# Patient Record
Sex: FEMALE | Race: WHITE | NOT HISPANIC OR LATINO | Employment: FULL TIME | URBAN - METROPOLITAN AREA
[De-identification: names, ages, dates, MRNs, and addresses within clinical notes are randomized per-mention and may not be internally consistent; named-entity substitution may affect disease eponyms.]

---

## 2017-07-31 DIAGNOSIS — G56.03 BILATERAL CARPAL TUNNEL SYNDROME: Primary | ICD-10-CM

## 2017-07-31 RX ORDER — IBUPROFEN 800 MG/1
800 TABLET ORAL EVERY 8 HOURS PRN
Qty: 30 TABLET | Refills: 0 | Status: SHIPPED | OUTPATIENT
Start: 2017-07-31 | End: 2018-08-17

## 2017-07-31 RX ORDER — IBUPROFEN 800 MG/1
800 TABLET ORAL EVERY 8 HOURS PRN
Refills: 0 | COMMUNITY
Start: 2017-06-07 | End: 2017-07-31 | Stop reason: SDUPTHER

## 2018-08-22 ENCOUNTER — OFFICE VISIT (OUTPATIENT)
Dept: FAMILY MEDICINE | Facility: CLINIC | Age: 33
End: 2018-08-22
Payer: MEDICAID

## 2018-08-22 VITALS
BODY MASS INDEX: 25.9 KG/M2 | DIASTOLIC BLOOD PRESSURE: 62 MMHG | SYSTOLIC BLOOD PRESSURE: 118 MMHG | OXYGEN SATURATION: 99 % | HEIGHT: 64 IN | WEIGHT: 151.69 LBS | HEART RATE: 83 BPM

## 2018-08-22 DIAGNOSIS — R53.82 CHRONIC FATIGUE: ICD-10-CM

## 2018-08-22 DIAGNOSIS — Z84.0 FAMILY HISTORY OF LUPUS ERYTHEMATOSUS: Primary | ICD-10-CM

## 2018-08-22 DIAGNOSIS — F52.0 LACK OF LIBIDO: ICD-10-CM

## 2018-08-22 DIAGNOSIS — Z00.00 ANNUAL PHYSICAL EXAM: Primary | ICD-10-CM

## 2018-08-22 DIAGNOSIS — Z13.220 LIPID SCREENING: ICD-10-CM

## 2018-08-22 PROCEDURE — 99395 PREV VISIT EST AGE 18-39: CPT | Mod: ,,, | Performed by: NURSE PRACTITIONER

## 2018-08-22 NOTE — PROGRESS NOTES
HPI: Pepper Fitzpatrick  is a 32 y.o. female who presents for annual physical .  No complaints at this time.     Review of Systems   Constitutional: Negative for activity change, appetite change and fever.   HENT: Negative for congestion, ear discharge, ear pain, sore throat, trouble swallowing and voice change.    Eyes: Negative for photophobia, pain, discharge and visual disturbance.   Respiratory: Negative for cough, chest tightness and shortness of breath.    Cardiovascular: Negative for chest pain and palpitations.   Gastrointestinal: Negative for abdominal pain, nausea and vomiting.   Endocrine: Negative for cold intolerance and heat intolerance.   Genitourinary: Negative for difficulty urinating and dysuria.   Musculoskeletal: Negative for arthralgias and gait problem.   Skin: Negative for rash.   Allergic/Immunologic: Negative for immunocompromised state.   Neurological: Negative for speech difficulty and headaches.   Psychiatric/Behavioral: Negative for confusion, self-injury and suicidal ideas.     Review of patient's allergies indicates:  No Known Allergies  Past Medical History:   Diagnosis Date    Abnormal Pap smear     Acute nonintractable headache     Arthralgia     Breast disorder     Carpal tunnel syndrome, bilateral     Chronic constipation     Renal calculi 3/25/2013     Past Surgical History:   Procedure Laterality Date    ABDOMINAL SURGERY      breast augmentation  2006    w/breast lift    BREAST SURGERY  2002    Mastitis    COSMETIC SURGERY      HERNIA REPAIR  2005    UMBILICAL HERNIA REPAIR       Family History   Problem Relation Age of Onset    Lupus Mother     Lupus Sister      Social History     Tobacco Use    Smoking status: Former Smoker    Smokeless tobacco: Never Used   Substance Use Topics    Alcohol use: Yes     Comment: occasional    Drug use: No      Health Maintenance Topics with due status: Not Due       Topic Last Completion Date    Pap Smear with HPV  Cot[facility] 07/16/2018       There is no immunization history on file for this patient.  OBJECTIVE:      Vitals:    08/22/18 1441   BP: 118/62   Pulse: 83     Physical Exam   Constitutional: She is oriented to person, place, and time. She appears well-developed and well-nourished. She is cooperative. No distress.   HENT:   Head: Normocephalic and atraumatic.   Right Ear: Tympanic membrane and external ear normal.   Left Ear: Tympanic membrane and external ear normal.   Nose: Nose normal.   Mouth/Throat: Oropharynx is clear and moist.   Eyes: Conjunctivae, EOM and lids are normal. Pupils are equal, round, and reactive to light. Lids are everted and swept, no foreign bodies found. Right pupil is round and reactive. Left pupil is round and reactive.   Neck: Trachea normal and normal range of motion. Neck supple.   Cardiovascular: Normal rate, regular rhythm, S1 normal, S2 normal, normal heart sounds and intact distal pulses.   No murmur heard.  Pulmonary/Chest: Effort normal and breath sounds normal. She has no wheezes.   Abdominal: Soft. Bowel sounds are normal. There is no rigidity and no guarding.   Musculoskeletal: Normal range of motion.   Lymphadenopathy:     She has no cervical adenopathy.     She has no axillary adenopathy.   Neurological: She is alert and oriented to person, place, and time.   Skin: Skin is warm and dry. Capillary refill takes less than 2 seconds.   Psychiatric: She has a normal mood and affect. Her behavior is normal. Judgment and thought content normal.   Nursing note and vitals reviewed.     Assessment:       1. Annual physical exam    2. Lipid screening        Plan:       Annual physical exam  -     Urinalysis; Future; Expected date: 08/22/2018    Lipid screening  -     Lipid panel; Future; Expected date: 08/22/2018    Other orders  -     Cancel: Tdap Vaccine  -Patient had baby 3 years ago and had tdap at that time.      Patient counseled on age appropriate medical preventative services, age  appropriate cancer screenings, nutrition, healthy diet, consistent exercise regimen and maintaining an active lifestyle.      Counseled on age appropriate vaccines and discussed upcoming health care needs based on age/gender.  Spent time with patient counseling on need for a good patient/doctor relationship moving forward.  Discussed use of common OTC medications and supplements.  Discussed common dietary aids and use of caffeine and the need for good sleep hygiene and stress management.    Follow-up in about 2 weeks (around 9/5/2018) for lab review.      8/22/2018 CASEY Knutson, FNP-C

## 2018-08-22 NOTE — PATIENT INSTRUCTIONS
Prevention Guidelines, Women Ages 18 to 39  Screening tests and vaccines are an important part of managing your health. Health counseling is essential, too. Below are guidelines for these, for women ages 18 to 39. Talk with your healthcare provider to make sure youre up-to-date on what you need.  Screening Who needs it How often   Alcohol misuse All women in this age group At routine exams   Blood pressure All women in this age group Every 2 years if your blood pressure is less than 120/80 mm Hg; yearly if your systolic blood pressure is 120 to 139 mm Hg, or your diastolic blood pressure reading is 80 to 89 mm Hg   Breast cancer All women in this age group should talk with their healthcare providers about the need for clinical breast exams (CBE)1 Clinical breast exam every 3 years1   Cervical cancer Women ages 21 and older Women between ages 21 and 29 should have a Pap test every 3 years; women between ages 30 and 65 are advised to have a Pap test plus an HPV test every 5 years   Chlamydia Sexually active women ages 24 and younger, and women at increased risk for infection Every 3 years if you're at risk or have symptoms   Depression All women in this age group At routine exams   Diabetes mellitus, type 2 Adults with no symptoms who are overweight or obese and have 1 or more other risk factors for diabetes At least every 3 years   Gonorrhea Sexually active women at increased risk for infection At routine exams   Hepatitis C Anyone at increased risk At routine exams   HIV All women At routine exams   Obesity All women in this age group At routine exams   Syphilis Women at increased risk for infection - talk with your healthcare provider At routine exams   Tuberculosis Women at increased risk for infection - talk with your healthcare provider Ask your healthcare provider   Vision All women in this age group At least 1 complete exam in your 20s, and 2 in your 30s   Vaccine2 Who needs it How often   Chickenpox  (varicella) All women in this age group who have no record of this infection or vaccine 2 doses; the second dose should be given 4 to 8 weeks after the first dose   Hepatitis A Women at increased risk for infection - talk with your healthcare provider 2 doses given at least 6 months apart   Hepatitis B Women at increased risk for infection - talk with your healthcare provider 3 doses over 6 months; second dose should be given 1 month after the first dose; the third dose should be given at least 2 months after the second dose and at least 4 months after the first dose   Haemophilus influenzae Type B (HIB) Women at increased risk for infection - talk with your healthcare provider 1 to 3 doses   Human papillomavirus (HPV) All women in this age group up to age 26 3 doses; the second dose should be given 1 to 2 months after the first dose and the third dose given 6 months after the first dose   Influenza (flu) All women in this age group Once a year   Measles, mumps, rubella (MMR) All women in this age group who have no record of these infections or vaccines 1 or 2 doses   Meningococcal Women at increased risk for infection - talk with your healthcare provider 1 or more doses   Pneumococcal conjugate vaccine (PCV13) and pneumococcal polysaccharide vaccine (PPSV23) Women at increased risk for infection - talk with your healthcare provider PCV13: 1 dose ages 19 to 65 (protects against 13 types of pneumococcal bacteria)  PPSV23: 1 to 2 doses through age 64, or 1 dose at 65 or older (protects against 23 types of pneumococcal bacteria)      Tetanus/diphtheria/pertussis (Td/Tdap) booster All women in this age group Td every 10 years, or a one-time dose of Tdap instead of a Td booster after age 18, then Td every 10 years   Counseling Who needs it How often   BRCA gene mutation testing for breast and ovarian cancer susceptibility Women with increased risk for having gene mutation When your risk is known   Breast cancer and  chemoprevention Women at high risk for breast cancer When your risk is known   Diet and exercise Women who are overweight or obese When diagnosed, and then at routine exams   Domestic violence Women at the age in which they are able to have children At routine exams   Sexually transmitted infection prevention Women who are sexually active At routine exams   Skin cancer Prevention of skin cancer in fair-skinned adults through age 24 At routine exams   Use of tobacco and the health effects it can cause All women in this age group Every visit   1According to the ACS, women ages 20 to 39 years should have a clinical breast exam (CBE) as part of their routine health exam every 3 years. Breast self-exams are an option for women starting in their 20s. But the  USPSTF does not recommend CBE.  2Those who are 18 years old and not up-to-date on their childhood vaccines should get all appropriate catch-up vaccines recommended by the CDC.  Date Last Reviewed: 8/27/2015  © 3679-4022 [employer]. [address] [place] [address], [place] [address]. All rights reserved. This information is not intended as a substitute for professional medical care. Always follow your healthcare professional's instructions.

## 2018-08-27 ENCOUNTER — TELEPHONE (OUTPATIENT)
Dept: FAMILY MEDICINE | Facility: CLINIC | Age: 33
End: 2018-08-27

## 2018-08-27 DIAGNOSIS — Z13.220 LIPID SCREENING: Primary | ICD-10-CM

## 2018-08-27 DIAGNOSIS — R53.82 CHRONIC FATIGUE: ICD-10-CM

## 2018-08-27 DIAGNOSIS — Z84.0 FAMILY HISTORY OF LUPUS ERYTHEMATOSUS: ICD-10-CM

## 2018-08-27 NOTE — TELEPHONE ENCOUNTER
----- Message from Eula Montaño LPN sent at 8/27/2018  2:32 PM CDT -----  Contact: Pepper      ----- Message -----  From: Jessenia Martinez  Sent: 8/24/2018  10:20 AM  To: Serge Davis Staff    Patient needs labs to be sent to [facility]. They were originally sent to [facility] and [facility] says they will not accept orders with [facility]' barcodes.

## 2018-09-05 ENCOUNTER — PATIENT MESSAGE (OUTPATIENT)
Dept: FAMILY MEDICINE | Facility: CLINIC | Age: 33
End: 2018-09-05

## 2018-09-09 LAB
1,25(OH)2D SERPL-MCNC: 36 PG/ML (ref 18–72)
1,25(OH)2D2 SERPL-MCNC: <8 PG/ML
1,25(OH)2D3 SERPL-MCNC: 36 PG/ML
ALBUMIN SERPL-MCNC: 4.7 G/DL (ref 3.6–5.1)
ALBUMIN/GLOB SERPL: 2 (CALC) (ref 1–2.5)
ALP SERPL-CCNC: 68 U/L (ref 33–115)
ALT SERPL-CCNC: 13 U/L (ref 6–29)
ANA SER QL IF: NEGATIVE
AST SERPL-CCNC: 16 U/L (ref 10–30)
BASOPHILS # BLD AUTO: 40 CELLS/UL (ref 0–200)
BASOPHILS NFR BLD AUTO: 0.8 %
BILIRUB SERPL-MCNC: 0.8 MG/DL (ref 0.2–1.2)
BUN SERPL-MCNC: 14 MG/DL (ref 7–25)
BUN/CREAT SERPL: NORMAL (CALC) (ref 6–22)
CALCIUM SERPL-MCNC: 9.4 MG/DL (ref 8.6–10.2)
CHLORIDE SERPL-SCNC: 106 MMOL/L (ref 98–110)
CHOLEST SERPL-MCNC: 173 MG/DL
CHOLEST/HDLC SERPL: 3.4 (CALC)
CO2 SERPL-SCNC: 28 MMOL/L (ref 20–32)
CREAT SERPL-MCNC: 0.64 MG/DL (ref 0.5–1.1)
EOSINOPHIL # BLD AUTO: 120 CELLS/UL (ref 15–500)
EOSINOPHIL NFR BLD AUTO: 2.4 %
ERYTHROCYTE [DISTWIDTH] IN BLOOD BY AUTOMATED COUNT: 11.8 % (ref 11–15)
GFR SERPL CREATININE-BSD FRML MDRD: 116 ML/MIN/1.73M2
GLOBULIN SER CALC-MCNC: 2.4 G/DL (CALC) (ref 1.9–3.7)
GLUCOSE SERPL-MCNC: 87 MG/DL (ref 65–99)
HCT VFR BLD AUTO: 40.2 % (ref 35–45)
HDLC SERPL-MCNC: 51 MG/DL
HGB BLD-MCNC: 13.6 G/DL (ref 11.7–15.5)
LDLC SERPL CALC-MCNC: 104 MG/DL (CALC)
LYMPHOCYTES # BLD AUTO: 1790 CELLS/UL (ref 850–3900)
LYMPHOCYTES NFR BLD AUTO: 35.8 %
MCH RBC QN AUTO: 30.8 PG (ref 27–33)
MCHC RBC AUTO-ENTMCNC: 33.8 G/DL (ref 32–36)
MCV RBC AUTO: 91 FL (ref 80–100)
MONOCYTES # BLD AUTO: 615 CELLS/UL (ref 200–950)
MONOCYTES NFR BLD AUTO: 12.3 %
NEUTROPHILS # BLD AUTO: 2435 CELLS/UL (ref 1500–7800)
NEUTROPHILS NFR BLD AUTO: 48.7 %
NONHDLC SERPL-MCNC: 122 MG/DL (CALC)
PLATELET # BLD AUTO: 188 THOUSAND/UL (ref 140–400)
PMV BLD REES-ECKER: 11.6 FL (ref 7.5–12.5)
POTASSIUM SERPL-SCNC: 3.8 MMOL/L (ref 3.5–5.3)
PROT SERPL-MCNC: 7.1 G/DL (ref 6.1–8.1)
RBC # BLD AUTO: 4.42 MILLION/UL (ref 3.8–5.1)
SODIUM SERPL-SCNC: 140 MMOL/L (ref 135–146)
TRIGL SERPL-MCNC: 89 MG/DL
TSH SERPL-ACNC: 1.93 MIU/L
WBC # BLD AUTO: 5 THOUSAND/UL (ref 3.8–10.8)

## 2018-09-25 ENCOUNTER — OFFICE VISIT (OUTPATIENT)
Dept: FAMILY MEDICINE | Facility: CLINIC | Age: 33
End: 2018-09-25
Payer: COMMERCIAL

## 2018-09-25 VITALS
HEART RATE: 56 BPM | HEIGHT: 63 IN | WEIGHT: 151.13 LBS | SYSTOLIC BLOOD PRESSURE: 112 MMHG | BODY MASS INDEX: 26.78 KG/M2 | DIASTOLIC BLOOD PRESSURE: 66 MMHG | OXYGEN SATURATION: 99 %

## 2018-09-25 DIAGNOSIS — E78.5 MILD HYPERLIPIDEMIA: Primary | ICD-10-CM

## 2018-09-25 DIAGNOSIS — E66.3 OVERWEIGHT (BMI 25.0-29.9): ICD-10-CM

## 2018-09-25 DIAGNOSIS — Z20.818 STREP THROAT EXPOSURE: ICD-10-CM

## 2018-09-25 PROCEDURE — 99213 OFFICE O/P EST LOW 20 MIN: CPT | Mod: ,,, | Performed by: NURSE PRACTITIONER

## 2018-09-25 RX ORDER — VIT C/E/ZN/COPPR/LUTEIN/ZEAXAN 250MG-90MG
1000 CAPSULE ORAL DAILY
Refills: 0 | COMMUNITY
Start: 2018-09-25 | End: 2020-09-21 | Stop reason: ALTCHOICE

## 2018-09-25 RX ORDER — ACETAMINOPHEN AND CODEINE PHOSPHATE 120; 12 MG/5ML; MG/5ML
0.35 SOLUTION ORAL DAILY
COMMUNITY
Start: 2018-09-02 | End: 2020-09-21 | Stop reason: ALTCHOICE

## 2018-09-25 RX ORDER — PENICILLIN V POTASSIUM 500 MG/1
500 TABLET, FILM COATED ORAL 2 TIMES DAILY
Qty: 20 TABLET | Refills: 0 | Status: SHIPPED | OUTPATIENT
Start: 2018-09-25 | End: 2018-10-16

## 2018-09-25 NOTE — PROGRESS NOTES
Subjective:       Patient ID: Pepper Fitzpatrick is a 32 y.o. female.    Chief Complaint: Follow-up (lab results)    Patient presents today for evaluation of labs recently drawn.  Patient had fatigue and low libido that she is concerned with.  Patient also was diagnosed with strep throat 2 weeks ago.  She was prescribed amoxil and took 5 days of the medication.  Patient then lost medication.  Patient is concerned now because she has started to have her sore throat return.  Patient's vitamin D was low normal.  Patient recently had mirena IUD removed and states sexual desire has improved.  Patient is now on oral birth control with her  planned to obtain a vasectomy.  Patient is overweight with a bmi of 26.77.      Hyperlipidemia   This is a new problem. The current episode started more than 1 month ago. The problem is uncontrolled. Recent lipid tests were reviewed and are variable. She has no history of chronic renal disease, diabetes, hypothyroidism, liver disease, obesity or nephrotic syndrome. Factors aggravating her hyperlipidemia include fatty foods. Pertinent negatives include no chest pain, focal sensory loss, focal weakness, leg pain, myalgias or shortness of breath. She is currently on no antihyperlipidemic treatment. The current treatment provides no improvement of lipids. Compliance problems include adherence to exercise and adherence to diet.  Risk factors for coronary artery disease include stress (overweight).     Review of Systems   Constitutional: Positive for fatigue. Negative for activity change, appetite change and fever.        Overweight   HENT: Positive for sore throat. Negative for congestion, trouble swallowing and voice change.    Eyes: Negative for photophobia, pain, discharge and visual disturbance.   Respiratory: Negative for cough, chest tightness and shortness of breath.    Cardiovascular: Negative for chest pain and palpitations.   Gastrointestinal: Negative for abdominal  pain, nausea and vomiting.   Endocrine: Negative for cold intolerance and heat intolerance.   Genitourinary: Negative for difficulty urinating and dysuria.        Low libido   Musculoskeletal: Negative for arthralgias, gait problem and myalgias.   Skin: Negative for rash.   Allergic/Immunologic: Negative for immunocompromised state.   Neurological: Negative for focal weakness, speech difficulty and headaches.   Psychiatric/Behavioral: Negative for confusion, self-injury and suicidal ideas.       Past Medical History:   Diagnosis Date    Abnormal Pap smear     Acute nonintractable headache     Arthralgia     Breast disorder     Carpal tunnel syndrome, bilateral     Chronic constipation     Renal calculi 3/25/2013      Past Surgical History:   Procedure Laterality Date    ABDOMINAL SURGERY      breast augmentation  2006    w/breast lift    BREAST SURGERY  2002    Mastitis    COSMETIC SURGERY      HERNIA REPAIR  2005    UMBILICAL HERNIA REPAIR         Family History   Problem Relation Age of Onset    Lupus Mother     Lupus Sister        Social History     Socioeconomic History    Marital status:      Spouse name: None    Number of children: None    Years of education: None    Highest education level: None   Social Needs    Financial resource strain: None    Food insecurity - worry: None    Food insecurity - inability: None    Transportation needs - medical: None    Transportation needs - non-medical: None   Occupational History    None   Tobacco Use    Smoking status: Former Smoker    Smokeless tobacco: Never Used   Substance and Sexual Activity    Alcohol use: Yes     Comment: occasional    Drug use: No    Sexual activity: Yes     Partners: Male     Birth control/protection: IUD   Other Topics Concern    None   Social History Narrative    None       Current Outpatient Medications   Medication Sig Dispense Refill    buprenorphine HCl/naloxone HCl (SUBOXONE SL) Place 1 Dose under  "the tongue 2 (two) times daily.      norethindrone (MICRONOR) 0.35 mg tablet Take 0.35 mg by mouth once daily.      cholecalciferol, vitamin D3, (VITAMIN D3) 1,000 unit capsule Take 1 capsule (1,000 Units total) by mouth once daily.  0    penicillin v potassium (VEETID) 500 MG tablet Take 1 tablet (500 mg total) by mouth 2 (two) times daily. 20 tablet 0     No current facility-administered medications for this visit.        Review of patient's allergies indicates:  No Known Allergies  Objective:    HPI     Follow-up      Additional comments: lab results          Last edited by Sky Velazquez MA on 9/25/2018  8:44 AM. (History)      Blood pressure 112/66, pulse (!) 56, height 5' 3" (1.6 m), weight 68.5 kg (151 lb 1.6 oz), SpO2 99 %. Body mass index is 26.77 kg/m².   Physical Exam   Constitutional: She is oriented to person, place, and time. She appears well-developed. She is cooperative. No distress.   overweight   HENT:   Head: Normocephalic and atraumatic.   Right Ear: Tympanic membrane normal.   Left Ear: Tympanic membrane normal.   Mouth/Throat: Uvula is midline and mucous membranes are normal. Posterior oropharyngeal erythema (mild) present. No oropharyngeal exudate or tonsillar abscesses.   Eyes: Conjunctivae, EOM and lids are normal. Pupils are equal, round, and reactive to light. Lids are everted and swept, no foreign bodies found. Right pupil is round and reactive. Left pupil is round and reactive.   Neck: Trachea normal and normal range of motion. Neck supple.   Cardiovascular: Normal rate, regular rhythm, S1 normal, S2 normal, normal heart sounds and intact distal pulses.   Pulmonary/Chest: Effort normal and breath sounds normal. She has no wheezes.   Abdominal: Soft. Bowel sounds are normal. There is no rigidity and no guarding.   Musculoskeletal: Normal range of motion.   Lymphadenopathy:     She has no cervical adenopathy.     She has no axillary adenopathy.   Neurological: She is alert and oriented " to person, place, and time.   Skin: Skin is warm and dry. Capillary refill takes less than 2 seconds.   Psychiatric: She has a normal mood and affect. Her behavior is normal. Judgment and thought content normal.   Nursing note and vitals reviewed.          Assessment:       1. Mild hyperlipidemia    2. Strep throat exposure    3. Overweight (BMI 25.0-29.9)        Plan:       Pepper was seen today for follow-up.    Diagnoses and all orders for this visit:    Mild hyperlipidemia  -     Lipid panel; Future  -     Lipid panel  -Limit red meat, butter, fried foods, cheese, and other foods that have a lot of saturated fat. Consume more: lean meats, fish, fruits, vegetables, whole grains, beans, lentils, and nuts.  Weight loss, and 30-45 min of cardiovascular exercise daily.     Strep throat exposure  -     penicillin v potassium (VEETID) 500 MG tablet; Take 1 tablet (500 mg total) by mouth 2 (two) times daily.    Overweight (BMI 25.0-29.9)  -The patient is asked to make an attempt to improve diet and exercise patterns to aid in medical management of this problem.    Other orders  -     cholecalciferol, vitamin D3, (VITAMIN D3) 1,000 unit capsule; Take 1 capsule (1,000 Units total) by mouth once daily.       Complete antibiotic fully.       Return in 6 months for hyperlipidemia.  Diet changes recommended. Labs to be drawn 1-2 weeks prior to next visit.

## 2018-09-25 NOTE — PATIENT INSTRUCTIONS
Lifestyle Changes to Control Cholesterol  You can control your cholesterol through diet, exercise, weight management, quitting smoking, stress management, and taking your medicines right. These things can also lower your risk for cardiovascular disease.    Eating healthy  Your healthcare provider will give you information on diet changes you may need to make. Your provider may recommend that you see a registered dietitian for help with diet changes. Changes may include:  · Cutting back on the amount of fat and cholesterol in your meals  · Eating less salt (sodium). This is especially important if you have high blood pressure.  · Eating more fresh vegetables and fruits  · Eating lean proteins such as fish, poultry, beans, and peas  · Eating less red meat and processed meats  · Using low-fat dairy products  · Using vegetable and nut oils in limited amounts  · Limiting how many sweets and processed foods like chips, cookies, and baked goods that you eat   · Limiting how many sugar-sweetened beverages you drink  · Limiting how often you eat out  Getting exercise  Regular exercise is a good way to help your body control cholesterol. Regular exercise can help in many ways. It can:  · Raise your good cholesterol  · Help lower your bad cholesterol  · Let blood flow better through your body  · Give more oxygen to your muscles and tissues  · Help you manage your weight  · Help your heart pump better  · Lower your blood pressure  Your healthcare provider may recommend that you get more physical activity if you haven't been active. Your provider may recommend that you get moderate to vigorous physical activity for at least 40 minutes each day. You should do this for at least 3 to 4 days each week. A few examples of moderate to vigorous activity are:  · Walking at a brisk pace. This is about 3 to 4 miles per hour.  · Jogging or running  · Swimming or water aerobics  · Hiking  · Dancing  · Martial arts  · Tennis  · Riding a  bicycle or stationary bike  · Dancing  Managing your weight  If you are overweight or obese, your healthcare provider will work with you to help you lose weight and lower your BMI (body mass index). Making diet changes and getting more physical activity can help. Changing your diet will help you lose weight more easily than adding exercise.  Quitting smoking  Smoking and other tobacco use can raise cholesterol and make it harder to control. Quitting is tough. But millions of people have given up tobacco for good. You can quit, too! Think about some of the reasons below to quit smoking. Do any of them make you think twice about your smoking habit?  Stop smoking because it:  · Keeps your cholesterol high, even if you make all the other changes youre supposed to  · Damages your body. It especially harms your heart, lungs, skin, and blood vessels.  · Makes you more likely to have a heart attack (acute myocardial infarction), stroke, or cancer  · Stains your teeth  · Makes your skin, clothes, and breath smell bad  · Costs a lot of money  Controlling stress   Learn ways to control stress. This will help you deal with stress in your home and work life. Controlling stress can greatly lower your risk of getting cardiovascular disease.  Making the most of medicines  Healthy eating and exercise are a good start to keeping your cholesterol down. But you may need some extra help from medicine. If your doctor prescribes medicine, be sure to take it exactly as directed. Remember:  · Tell your healthcare provider about all other medicines you take. This includes vitamins and herbs.  · Tell your healthcare provider if you have any side effects after starting to take a medicine. Examples of side effects to watch for include muscle aches, weakness, blurred vision, rust-colored urine, yellowing of eyes or skin (jaundice), and headache.  · Dont skip a dose or stop taking your medicine because you feel better or because  your cholesterol numbers go down. Never stop taking your medicine unless your healthcare provider has told you its OK.  · Ask your healthcare provider if you have any questions about your medicines.  High risk groups  Some people may need to take medicines called statins to control their cholesterol. This is in addition to eating a healthy diet and getting regular exercise.  Statins can help you stay healthy. They can also help prevent a heart attack or stroke. You may need to take a statin if you are in one of these groups:  · Adults who have had a heart attack or stroke. Or adults who have had peripheral vascular disease, a ministroke (transient ischemic attack), or stable or unstable angina. This group also includes people who have had a procedure to restore blood flow through a blocked artery. These procedures include percutaneous coronary intervention, angioplasty, stent, and open-heart bypass surgery.  · Adults who have diabetes. Or adults who are at higher risk of having a heart attack or stroke and have an LDL cholesterol level of 70 to 189 mg/dL  · Adults who are 21 years old or older and have an LDL cholesterol level of 190 mg/dL or higher.  If you are in a high-risk group, talk with your healthcare provider about your treatment goals. Make sure you understand why these goals are important, based on your own health history and your family history of heart disease or high cholesterol.  Make a plan to have regular cholesterol checks. You may need to fast before getting this test. Also ask your provider about any side effects your medicines may cause. Let your provider know about any side effects you have. You may need to take more than one medicine to reach the cholesterol goals that you and your provider decide on.  Date Last Reviewed: 10/1/2016  © 8680-9062 [employer]. [address] [place] [address], [place] [address]. All rights reserved. This information is not intended as a substitute for  professional medical care. Always follow your healthcare professional's instructions.        Aerobic Exercise for a Healthy Heart  Exercise is a lot more than an energy booster and a stress reliever. It also strengthens your heart muscle, lowers your blood pressure and cholesterol, and burns calories. It can also improve your resting muscle tone, and your mood.     Remember, some activity is better than none.    Choose an aerobic activity  Choose an activity that makes your heart and lungs work harder than they do when you rest or walk normally. This aerobic exercise can improve the way your heart and other muscles use oxygen. Make it fun by exercising with a friend and choosing an activity you enjoy. Here are some ideas:  · Walking  · Swimming  · Bicycling  · Stair climbing  · Dancing  · Jogging  · Gardening  Exercise regularly  If you havent been exercising regularly,  get your doctors OK first. Then start slowly.  Here are some tips:  · Begin exercising 3 times a week for 5 to 10 minutes at a time.  · When you feel comfortable, add a few minutes each session.  · Slowly build up to exercising 3 to 4 times each week. Each session should last for 40 minutes, on average, and involve moderate- to vigorous-intensity physical activity.  · If you have been given nitroglycerin, be sure to carry it when you exercise.  · If you get chest pain (angina) when youre exercising, stop what youre doing, take your nitroglycerin, and call your doctor.  Date Last Reviewed: 6/2/2016  © 3888-7995 [employer]. [address] [place] [address], [place] [address]. All rights reserved. This information is not intended as a substitute for professional medical care. Always follow your healthcare professional's instructions.

## 2018-10-08 ENCOUNTER — PATIENT MESSAGE (OUTPATIENT)
Dept: FAMILY MEDICINE | Facility: CLINIC | Age: 33
End: 2018-10-08

## 2018-10-16 ENCOUNTER — OFFICE VISIT (OUTPATIENT)
Dept: FAMILY MEDICINE | Facility: CLINIC | Age: 33
End: 2018-10-16
Payer: COMMERCIAL

## 2018-10-16 VITALS
HEART RATE: 76 BPM | BODY MASS INDEX: 26.81 KG/M2 | OXYGEN SATURATION: 99 % | HEIGHT: 63 IN | DIASTOLIC BLOOD PRESSURE: 66 MMHG | SYSTOLIC BLOOD PRESSURE: 120 MMHG | WEIGHT: 151.31 LBS

## 2018-10-16 DIAGNOSIS — E66.3 OVERWEIGHT (BMI 25.0-29.9): ICD-10-CM

## 2018-10-16 DIAGNOSIS — R22.0 SWELLING OF BOTH LIPS: ICD-10-CM

## 2018-10-16 DIAGNOSIS — M25.531 RIGHT WRIST PAIN: ICD-10-CM

## 2018-10-16 DIAGNOSIS — R21 RASH: ICD-10-CM

## 2018-10-16 DIAGNOSIS — T78.40XA ALLERGIC REACTION, INITIAL ENCOUNTER: Primary | ICD-10-CM

## 2018-10-16 PROCEDURE — 99214 OFFICE O/P EST MOD 30 MIN: CPT | Mod: ,,, | Performed by: NURSE PRACTITIONER

## 2018-10-16 RX ORDER — CETIRIZINE HYDROCHLORIDE 10 MG/1
10 TABLET ORAL NIGHTLY
Qty: 30 TABLET | Refills: 2 | COMMUNITY
Start: 2018-10-16 | End: 2020-09-21 | Stop reason: ALTCHOICE

## 2018-10-16 NOTE — PROGRESS NOTES
Subjective:       Patient ID: Pepper Fitzpatrick is a 32 y.o. female.    Chief Complaint: Mass (bumps on arms)    Patient presents today with complaints of rash and lip swelling.  These problems are new and have been recurrent over the last 4 months.  Lips swell (mainly the bottom lip on the mouth) and then after swelling is reduced somewhat, aphthous ulcers appear on the inside of the lip.  Patient describes the ulcerations as painful and sore.  Patient denies history of herpes labialis.  Patient also complains of wrist pain with numbness and tingling of the right hand and arm.  This has worsened over the last few years.  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  Patient complains of right hand only pain and numbness with tingling that is exacerbated with movement and certain position.  Rest relieves the pain and symptoms.      Rash   This is a recurrent problem. The current episode started more than 1 month ago. The problem has been waxing and waning since onset. The affected locations include the left axilla, right axilla, left arm and right arm. The rash is characterized by itchiness and redness. It is unknown if there was an exposure to a precipitant. Pertinent negatives include no anorexia, congestion, cough, diarrhea, eye pain, facial edema, fatigue, fever, joint pain, nail changes, rhinorrhea, shortness of breath, sore throat or vomiting. Past treatments include nothing. The treatment provided no relief. Her past medical history is significant for allergies. There is no history of asthma or eczema.   Wrist Pain    The pain is present in the right wrist. This is a recurrent problem. The current episode started more than 1 month ago. There has been no history of extremity trauma. The problem occurs daily. The problem has been waxing and waning. The pain is at a severity of 4/10. The pain is moderate. Associated symptoms include numbness and tingling.  Pertinent negatives include no fever, inability to bear weight, itching, joint locking, joint swelling, limited range of motion or stiffness. The symptoms are aggravated by activity. She has tried rest for the symptoms. The treatment provided mild relief. Family history does not include gout or rheumatoid arthritis. There is no history of diabetes, gout, osteoarthritis or rheumatoid arthritis.     Review of Systems   Constitutional: Negative for activity change, appetite change, fatigue and fever.        Overweight   HENT: Positive for mouth sores. Negative for congestion, ear discharge, ear pain, rhinorrhea, sore throat, trouble swallowing and voice change.    Eyes: Negative for photophobia, pain, discharge and visual disturbance.   Respiratory: Negative for cough, chest tightness and shortness of breath.    Cardiovascular: Negative for chest pain and palpitations.   Gastrointestinal: Negative for abdominal pain, anorexia, diarrhea, nausea and vomiting.   Endocrine: Negative for cold intolerance and heat intolerance.   Genitourinary: Negative for difficulty urinating and dysuria.   Musculoskeletal: Negative for arthralgias, gait problem, gout, joint pain and stiffness.        Right wrist pain   Skin: Positive for rash. Negative for itching and nail changes.   Allergic/Immunologic: Negative for immunocompromised state.   Neurological: Positive for tingling and numbness. Negative for speech difficulty and headaches.   Psychiatric/Behavioral: Negative for confusion, self-injury and suicidal ideas.       Past Medical History:   Diagnosis Date    Abnormal Pap smear     Acute nonintractable headache     Arthralgia     Breast disorder     Carpal tunnel syndrome, bilateral     Chronic constipation     Renal calculi 3/25/2013      Past Surgical History:   Procedure Laterality Date    ABDOMINAL SURGERY      breast augmentation  2006    w/breast lift    BREAST SURGERY  2002    Mastitis    COSMETIC SURGERY       "HERNIA REPAIR  2005    UMBILICAL HERNIA REPAIR         Family History   Problem Relation Age of Onset    Lupus Mother     Lupus Sister        Social History     Socioeconomic History    Marital status:      Spouse name: None    Number of children: None    Years of education: None    Highest education level: None   Social Needs    Financial resource strain: None    Food insecurity - worry: None    Food insecurity - inability: None    Transportation needs - medical: None    Transportation needs - non-medical: None   Occupational History    None   Tobacco Use    Smoking status: Former Smoker    Smokeless tobacco: Never Used   Substance and Sexual Activity    Alcohol use: Yes     Comment: occasional    Drug use: No    Sexual activity: Yes     Partners: Male     Birth control/protection: IUD   Other Topics Concern    None   Social History Narrative    None       Current Outpatient Medications   Medication Sig Dispense Refill    buprenorphine HCl/naloxone HCl (SUBOXONE SL) Place 1 Dose under the tongue 2 (two) times daily.      norethindrone (MICRONOR) 0.35 mg tablet Take 0.35 mg by mouth once daily.      cetirizine (ZYRTEC) 10 MG tablet Take 1 tablet (10 mg total) by mouth every evening. 30 tablet 2    cholecalciferol, vitamin D3, (VITAMIN D3) 1,000 unit capsule Take 1 capsule (1,000 Units total) by mouth once daily.  0     No current facility-administered medications for this visit.        Review of patient's allergies indicates:  No Known Allergies  Objective:    HPI     Mass      Additional comments: bumps on arms          Last edited by Eula Montaño LPN on 10/16/2018 12:59 PM. (History)      Blood pressure 120/66, pulse 76, height 5' 3" (1.6 m), weight 68.6 kg (151 lb 4.8 oz), SpO2 99 %. Body mass index is 26.8 kg/m².   Physical Exam   Constitutional: She is oriented to person, place, and time. She appears well-developed and well-nourished. She is cooperative. No distress.   HENT: "   Head: Normocephalic and atraumatic.   Right Ear: Tympanic membrane normal.   Left Ear: Tympanic membrane normal.   Mouth/Throat: Oral lesions (aphthous ulcerations noted to left inner bottom lip to the oral mucosa) present. No oropharyngeal exudate, posterior oropharyngeal edema or posterior oropharyngeal erythema.       Eyes: Conjunctivae, EOM and lids are normal. Pupils are equal, round, and reactive to light. Lids are everted and swept, no foreign bodies found. Right pupil is round and reactive. Left pupil is round and reactive.   Neck: Trachea normal and normal range of motion. Neck supple.   Cardiovascular: Normal rate, regular rhythm, S1 normal, S2 normal, normal heart sounds and intact distal pulses.   Pulmonary/Chest: Breath sounds normal.   Abdominal: Soft. Bowel sounds are normal. There is no rigidity and no guarding.   Musculoskeletal: Normal range of motion.        Right wrist: She exhibits no swelling and no effusion.        Arms:  Area of pain   Lymphadenopathy:     She has no cervical adenopathy.     She has no axillary adenopathy.   Neurological: She is alert and oriented to person, place, and time.   Skin: Skin is warm and dry. Capillary refill takes less than 2 seconds. Rash noted. Rash is papular (papular lesiona noted to axilla bilaterally and both arms in different areas.  Non-tender.  No erythma noted.  No drainage noted.).        Psychiatric: She has a normal mood and affect. Her behavior is normal. Judgment and thought content normal.   Nursing note and vitals reviewed.          Assessment:       1. Allergic reaction, initial encounter    2. Swelling of both lips    3. Rash    4. Right wrist pain    5. Overweight (BMI 25.0-29.9)        Plan:       Pepper was seen today for mass.    Diagnoses and all orders for this visit:    Allergic reaction, initial encounter  -     Ambulatory referral to Allergy  -     cetirizine (ZYRTEC) 10 MG tablet; Take 1 tablet (10 mg total) by mouth every  evening.    Swelling of both lips  -     Ambulatory referral to Allergy    Rash  -     cetirizine (ZYRTEC) 10 MG tablet; Take 1 tablet (10 mg total) by mouth every evening.    Right wrist pain  -Advised on wrist stretching and wearing brace at night.  Encouraged hard brace at night and soft brace during the day if needed when repetitive motions are being performed that exacerbate condition.    Overweight (BMI 25.0-29.9)  -The patient is asked to make an attempt to improve diet and exercise patterns to aid in medical management of this problem.         Follow up with allergy for testing to determine what condition is causing lip swelling and rash.

## 2018-10-16 NOTE — PATIENT INSTRUCTIONS
Self-Care for Skin Rashes     Pat your skin dry. Do not rub.     When your skin reacts to a substance your body is sensitive to, it can cause a rash. You can treat most rashes at home by keeping the skin clean and dry. Many rashes may get better on their own within 2 to 3 days. You may need medical attention if your rash itches, drains, or hurts, particularly if the rash is getting worse.  What can cause a skin rash?  · Sun poisoning, caused by too much exposure to the sun  · An irritant or allergic reaction to a certain type of food, plant, or chemical, such as  shellfish, poison ivy, and or cleaning products  · An infection caused by a fungus (ringworm), virus (chickenpox), or bacteria (strep)  · Bites or infestation caused by insects or pests, such as ticks, lice, or mites  · Dry skin, which is often seen during the winter months and in older people  How can I control itching and skin damage?  · Take soothing lukewarm baths in a colloidal oatmeal product. You can buy this at the [employer].  · Do your best not to scratch. Clip fingernails short, especially in young children, to reduce skin damage if scratching does occur.  · Use moisturizing skin lotion instead of scratching your dry skin.  · Use sunscreen whenever going out into direct sun.  · Use only mild cleansing agents whenever possible.  · Wash with mild, nonirritating soap and warm water.  · Wear clothing that breathes, such as cotton shirts or canvas shoes.  · If fluid is seeping from the rash, cover it loosely with clean gauze to absorb the discharge.  · Many rashes are contagious. Prevent the rash from spreading to others by washing your hands often before or after touching others with any skin rash.  Use medicine  · Antihistamines such as diphenhydramine can help control itching. But use with caution because they can make you drowsy.  · Using over-the-counter hydrocortisone cream on small rashes may help reduce swelling and itching  · Most  over-the-counter antifungal medicines can treat athletes foot and many other fungal infections of the skin.  Check with your healthcare provider  Call your healthcare provider if:  · You were told that you have a fungal infection on your skin to make sure you have the correct type of medicine.  · You have questions or concerns about medicines or their side effects.     Call 911  Call 911 if either of these occur:  · Your tongue or lips start to swell  · You have difficulty breathing      Call your healthcare provider  Call your healthcare provider if any of these occur:  · Temperature of more than 101.0°F (38.3°C), or as directed  · Sore throat, a cough, or unusual fatigue  · Red, oozy, or painful rash gets worse. These are signs of infection.  · Rash covers your face, genitals, or most of your body  · Crusty sores or red rings that begin to spread  · You were exposed to someone who has a contagious rash, such as scabies or lice.  · Red bulls-eye rash with a white center (a sign of Lyme disease)  · You were told that you have resistant bacteria (MRSA) on your skin.   Date Last Reviewed: 5/12/2015  © 6661-2772 [employer]. [address] [place] [address], [place] [address]. All rights reserved. This information is not intended as a substitute for professional medical care. Always follow your healthcare professional's instructions.

## 2018-10-17 ENCOUNTER — PATIENT MESSAGE (OUTPATIENT)
Dept: FAMILY MEDICINE | Facility: CLINIC | Age: 33
End: 2018-10-17

## 2018-10-18 ENCOUNTER — PATIENT MESSAGE (OUTPATIENT)
Dept: FAMILY MEDICINE | Facility: CLINIC | Age: 33
End: 2018-10-18

## 2018-10-18 DIAGNOSIS — K12.1 MOUTH ULCER: Primary | ICD-10-CM

## 2018-11-09 ENCOUNTER — PATIENT MESSAGE (OUTPATIENT)
Dept: FAMILY MEDICINE | Facility: CLINIC | Age: 33
End: 2018-11-09

## 2018-11-09 DIAGNOSIS — K08.89 PAIN, DENTAL: Primary | ICD-10-CM

## 2018-11-09 RX ORDER — IBUPROFEN 800 MG/1
800 TABLET ORAL 2 TIMES DAILY PRN
Qty: 20 TABLET | Refills: 0 | Status: SHIPPED | OUTPATIENT
Start: 2018-11-09

## 2018-11-09 RX ORDER — AMOXICILLIN 875 MG/1
875 TABLET, FILM COATED ORAL EVERY 12 HOURS
Qty: 10 TABLET | Refills: 0 | Status: SHIPPED | OUTPATIENT
Start: 2018-11-09 | End: 2018-12-07 | Stop reason: ALTCHOICE

## 2018-11-19 ENCOUNTER — PATIENT MESSAGE (OUTPATIENT)
Dept: FAMILY MEDICINE | Facility: CLINIC | Age: 33
End: 2018-11-19

## 2018-12-07 ENCOUNTER — OFFICE VISIT (OUTPATIENT)
Dept: URGENT CARE | Facility: CLINIC | Age: 33
End: 2018-12-07
Payer: COMMERCIAL

## 2018-12-07 VITALS
DIASTOLIC BLOOD PRESSURE: 91 MMHG | HEIGHT: 63 IN | OXYGEN SATURATION: 100 % | SYSTOLIC BLOOD PRESSURE: 127 MMHG | TEMPERATURE: 98 F | BODY MASS INDEX: 26.58 KG/M2 | WEIGHT: 150 LBS | HEART RATE: 86 BPM | RESPIRATION RATE: 14 BRPM

## 2018-12-07 DIAGNOSIS — K08.89 PAIN, DENTAL: ICD-10-CM

## 2018-12-07 DIAGNOSIS — A60.00 HERPES SIMPLEX INFECTION OF GENITOURINARY SYSTEM: ICD-10-CM

## 2018-12-07 DIAGNOSIS — R30.0 DYSURIA: Primary | ICD-10-CM

## 2018-12-07 LAB
BILIRUB UR QL STRIP: POSITIVE
GLUCOSE UR QL STRIP: POSITIVE
KETONES UR QL STRIP: POSITIVE
LEUKOCYTE ESTERASE UR QL STRIP: NEGATIVE
PH, POC UA: 5
POC BLOOD, URINE: NEGATIVE
POC NITRATES, URINE: POSITIVE
PROT UR QL STRIP: POSITIVE
SP GR UR STRIP: 1.02 (ref 1–1.03)
UROBILINOGEN UR STRIP-ACNC: 12 (ref 0.1–1.1)

## 2018-12-07 PROCEDURE — 81003 URINALYSIS AUTO W/O SCOPE: CPT | Mod: QW,S$GLB,, | Performed by: NURSE PRACTITIONER

## 2018-12-07 PROCEDURE — 99204 OFFICE O/P NEW MOD 45 MIN: CPT | Mod: 25,S$GLB,, | Performed by: NURSE PRACTITIONER

## 2018-12-07 RX ORDER — IBUPROFEN 800 MG/1
TABLET ORAL
Qty: 20 TABLET | Refills: 0 | OUTPATIENT
Start: 2018-12-07

## 2018-12-07 RX ORDER — VALACYCLOVIR HYDROCHLORIDE 500 MG/1
500 TABLET, FILM COATED ORAL 2 TIMES DAILY
Qty: 60 TABLET | Refills: 11 | Status: SHIPPED | OUTPATIENT
Start: 2018-12-07 | End: 2020-09-21 | Stop reason: ALTCHOICE

## 2018-12-07 RX ORDER — CEPHALEXIN 500 MG/1
500 CAPSULE ORAL EVERY 8 HOURS
Qty: 30 CAPSULE | Refills: 0 | Status: SHIPPED | OUTPATIENT
Start: 2018-12-07 | End: 2018-12-17

## 2018-12-08 NOTE — PROGRESS NOTES
"Subjective:       Patient ID: Pepper Fitzpatrick is a 33 y.o. female.    Vitals:  height is 5' 3" (1.6 m) and weight is 68 kg (150 lb). Her oral temperature is 98.4 °F (36.9 °C). Her blood pressure is 127/91 (abnormal) and her pulse is 86. Her respiration is 14 and oxygen saturation is 100%.     Chief Complaint:33 year old female presents complaining of vaginal outbreak of genital herpes. She reports last outbreak was approximately 15 years ago. She denies any fever, chills, nausea, vomiting or abdominal pain. No alleviating factors. Pain worsens with urinating.   Genital Warts   This is a new problem. The current episode started in the past 7 days. Pertinent negatives include no abdominal pain, chills, fever, nausea, rash or vomiting.       Constitution: Negative for chills and fever.   Neck: Negative for painful lymph nodes.   Gastrointestinal: Negative for abdominal pain, nausea and vomiting.   Genitourinary: Positive for genital sore. Negative for dysuria, frequency, urgency, urine decreased, hematuria, history of kidney stones, painful menstruation, irregular menstruation, missed menses, heavy menstrual bleeding, ovarian cysts, genital trauma, vaginal pain, vaginal discharge, vaginal bleeding, vaginal odor, painful intercourse, painful ejaculation and pelvic pain.   Musculoskeletal: Negative for back pain.   Skin: Negative for rash and lesion.   Hematologic/Lymphatic: Negative for swollen lymph nodes.       Objective:      Physical Exam   Constitutional: She is oriented to person, place, and time. She appears well-developed and well-nourished.   HENT:   Head: Normocephalic and atraumatic.   Right Ear: External ear normal.   Left Ear: External ear normal.   Nose: Nose normal. No nasal deformity. No epistaxis.   Mouth/Throat: Oropharynx is clear and moist and mucous membranes are normal.   Eyes: Conjunctivae and lids are normal.   Neck: Trachea normal, normal range of motion and phonation normal. Neck supple. "   Cardiovascular: Normal rate, regular rhythm, normal heart sounds and normal pulses.   Pulmonary/Chest: Effort normal and breath sounds normal.   Abdominal: Soft. Normal appearance and bowel sounds are normal. She exhibits no distension and no mass. There is no tenderness. There is no CVA tenderness.   Genitourinary:       There is tenderness and lesion on the left labia.   Neurological: She is alert and oriented to person, place, and time.   Skin: Skin is warm, dry and intact.   Psychiatric: She has a normal mood and affect. Her speech is normal and behavior is normal. Cognition and memory are normal.   Nursing note and vitals reviewed.      Assessment:       1. Dysuria    2. Herpes simplex infection of genitourinary system        Plan:         Dysuria  -     POCT Urinalysis, Dipstick, Automated, W/O Scope    Herpes simplex infection of genitourinary system

## 2018-12-08 NOTE — PATIENT INSTRUCTIONS
Living with Herpes  To speed healing, take care of open herpes sores. To reduce outbreaks, take care of your health. And to keep from infecting others, learn how to avoid spreading the virus.     To ease symptoms  · Start episodic treatment at the first sign of symptoms, such as itching or tingling.  · Take ibuprofen or acetaminophen to limit any pain.  · Sit in a warm or cool bath or use a moist compress to lessen the itching of sores. For some women, genital outbreaks cause burning during urination. In such cases, urinating in a tub of warm water helps reduce burning.  · Wear white cotton underwear and loose clothing during outbreaks. Dont wear nylon underwear or tight clothes. They can prevent sores from healing.     To speed healing  · Wash sores with mild soap and water. Pat (don't rub) the sores completely dry.  · Always wash your hands after touching a sore.  · Dont bandage sores. Air helps them heal.  · Avoid using any ointment unless it is prescribed. Applying the wrong jelly or cream may hold in moisture and slow healing.  · Dont pick at the sores. This can slow healing, and might cause a sore to become infected.  · If you wear contacts, wash your hands well before putting them in.     To reduce outbreaks  · Eat a balanced diet. Your health care provider may suggest taking supplements. These help ensure that you get all the nutrients you need.  · Get plenty of sleep. This helps your immune system work its best.  · Limit stress and tension. Both can weaken the bodys defenses.  · Limit exposure to sun, wind, and extreme heat or cold. Wear sunscreen and lip balm to help prevent outbreaks.     To protect others  · Tell your current sex partner and any future partners that you have herpes. If you dont know what to say, ask your healthcare provider for help.  · Use a latex condom that covers the affected areas each time you have sex. This reduces the risk of passing herpes to your partner.  · Avoid  kissing when you have an oral sore.  · Do not have intercourse when genital sores are present. Also keep in mind, herpes can be passed during oral sex and with anal contact.  · Dont share towels, toothbrushes, lip balm, or lipstick when you have a sore.  · If you have very frequent outbreaks, taking daily antiviral medicines can help reduce the likelihood of transmission to your partner.  Date Last Reviewed: 1/1/2017 © 2000-2017 [employer]. [address] [place] [address], [place] [address]. All rights reserved. This information is not intended as a substitute for professional medical care. Always follow your healthcare professional's instructions.        Diagnosing Herpes  You will be asked about your health history. You may be asked about your eating and sleeping habits and sexual history. Mention if you have sores or if you have had any in the past. Also mention if you feel tingling or itching before an outbreak.  What a sore looks like        A herpes sore may first appear as a small white blister. The fluid inside the blister is filled with the herpes virus. At this stage the virus sheds easily. This means it can be passed to other people.   A soft wet ulcer may form in place of the blister. The herpes virus is in the fluid of the open sore. As a result, the virus can still be spread to others.                 A soft crust forms as a new layer of skin grows. Fewer copies of the virus are present in the sore.   The skin surface is normal, but the virus remains in the body. Shedding is less likely, but it can still occur.      Testing for herpes  If herpes is suspected, tests such as these may be done to confirm the diagnosis:  · Viral culture. A small amount of fluid is swabbed from the base of a blister. The fluid is grown in a special culture with healthy cells. If herpes is present, it will alter the look of the cells.  · Fluorescent antibody test. Cells are taken from the base of a blister. They are  stained and checked under a microscope. If herpes is present, the cells will change color.  · Molecular amplification. A sample of fluid suspected of containing herpes virus is mixed with chemicals that allow pieces of the virus to multiply very quickly. These viral fragments can be detected very rapidly.  · Other tests. If sores are not present, tests can be run on blood or cell samples. These tests show if you carry the herpes virus.   Date Last Reviewed: 1/1/2017 © 2000-2017 [employer]. [address] [place] [address]. All rights reserved. This information is not intended as a substitute for professional medical care. Always follow your healthcare professional's instructions.        Urinary Tract Infections in Women    Urinary tract infections (UTIs) are most often caused by bacteria (germs). These bacteria enter the urinary tract. The bacteria may come from outside the body. Or they may travel from the skin outside the rectum or vagina into the urethra. Female anatomy makes it easy for bacteria from the bowel to enter a womans urinary tract, which is the most common source of UTI. This means women develop UTIs more often than men. Pain in or around the urinary tract is a common UTI symptom. But the only way to know for sure if you have a UTI for the healthcare provider to test your urine. The two tests that may be done are the urinalysis and urine culture.  Types of UTIs  · Cystitis: A bladder infection (cystitis) is the most common UTI in women. You may have urgent or frequent urination. You may also have pain, burning when you urinate, and bloody urine.  · Urethritis: This is an inflamed urethra, which is the tube that carries urine from the bladder to outside the body. You may have lower stomach or back pain. You may also have urgent or frequent urination.  · Pyelonephritis: This is a kidney infection. If not treated, it can be serious and damage your kidneys. In severe cases, you  may be hospitalized. You may have a fever and lower back pain.  Medicines to treat a UTI  Most UTIs are treated with antibiotics. These kill the bacteria. The length of time you need to take them depends on the type of infection. It may be as short as 3 days. If you have repeated UTIs, a low-dose antibiotic may be needed for several months. Take antibiotics exactly as directed. Dont stop taking them until all of the medicine is gone. If you stop taking the antibiotic too soon, the infection may not go away, and you may develop a resistance to the antibiotic. This can make it much harder to treat.  Lifestyle changes to treat and prevent UTIs  The lifestyle changes below will help get rid of your UTI. They may also help prevent future UTIs.  · Drink plenty of fluids. This includes water, juice, or other caffeine-free drinks. Fluids help flush bacteria out of your body.  · Empty your bladder. Always empty your bladder when you feel the urge to urinate. And always urinate before going to sleep. Urine that stays in your bladder can lead to infection. Try to urinate before and after sex as well.  · Practice good personal hygiene. Wipe yourself from front to back after using the toilet. This helps keep bacteria from getting into the urethra.  · Use condoms during sex. These help prevent UTIs caused by sexually transmitted bacteria. Also, avoid using spermicides during sex. These can increase the risk of UTIs. Choose other forms of birth control instead. For women who tend to get UTIs after sex, a low-dose of a preventive antibiotic may be used. Be sure to discuss this option with your healthcare provider.  · Follow up with your healthcare provider as directed. He or she may test to make sure the infection has cleared. If needed, more treatment may be started.  Date Last Reviewed: 1/1/2017  © 7820-1668 [employer]. [address] [place] [address], [place] [address]. All rights reserved. This information is not  intended as a substitute for professional medical care. Always follow your healthcare professional's instructions.

## 2018-12-16 LAB
BACTERIA UR CULT: ABNORMAL
OTHER ANTIBIOTIC SUSC ISLT: ABNORMAL

## 2019-09-28 DIAGNOSIS — K08.89 PAIN, DENTAL: ICD-10-CM

## 2019-10-01 RX ORDER — IBUPROFEN 800 MG/1
TABLET ORAL
Qty: 20 TABLET | Refills: 0 | OUTPATIENT
Start: 2019-10-01

## 2019-10-09 ENCOUNTER — HOSPITAL ENCOUNTER (EMERGENCY)
Facility: HOSPITAL | Age: 34
Discharge: HOME | End: 2019-10-09
Attending: EMERGENCY MEDICINE
Payer: COMMERCIAL

## 2019-10-09 VITALS
OXYGEN SATURATION: 99 % | SYSTOLIC BLOOD PRESSURE: 119 MMHG | RESPIRATION RATE: 17 BRPM | DIASTOLIC BLOOD PRESSURE: 67 MMHG | TEMPERATURE: 99 F | HEART RATE: 70 BPM

## 2019-10-09 DIAGNOSIS — T78.3XXA ANGIOEDEMA OF LIPS, INITIAL ENCOUNTER: Primary | ICD-10-CM

## 2019-10-09 LAB
B-HCG UR QL: NEGATIVE
CTP QC/QA: YES

## 2019-10-09 PROCEDURE — 99284 EMERGENCY DEPT VISIT MOD MDM: CPT | Mod: 25

## 2019-10-09 PROCEDURE — 96372 THER/PROPH/DIAG INJ SC/IM: CPT | Mod: 59

## 2019-10-09 PROCEDURE — 81025 URINE PREGNANCY TEST: CPT | Performed by: EMERGENCY MEDICINE

## 2019-10-09 PROCEDURE — 63600175 PHARM REV CODE 636 W HCPCS: Performed by: EMERGENCY MEDICINE

## 2019-10-09 RX ORDER — DEXAMETHASONE SODIUM PHOSPHATE 4 MG/ML
8 INJECTION, SOLUTION INTRA-ARTICULAR; INTRALESIONAL; INTRAMUSCULAR; INTRAVENOUS; SOFT TISSUE
Status: COMPLETED | OUTPATIENT
Start: 2019-10-09 | End: 2019-10-09

## 2019-10-09 RX ORDER — PREDNISONE 20 MG/1
40 TABLET ORAL DAILY
Qty: 10 TABLET | Refills: 0 | Status: SHIPPED | OUTPATIENT
Start: 2019-10-09 | End: 2019-10-14

## 2019-10-09 RX ADMIN — DEXAMETHASONE SODIUM PHOSPHATE 8 MG: 4 INJECTION, SOLUTION INTRA-ARTICULAR; INTRALESIONAL; INTRAMUSCULAR; INTRAVENOUS; SOFT TISSUE at 09:10

## 2019-10-09 NOTE — DISCHARGE INSTRUCTIONS
We cannot rule out potential life-threatening pathology including Dey-Larry syndrome.  Return immediately for any blistering, ulceration, or progression of symptoms.  Take Zyrtec in the morning and at night.

## 2019-10-09 NOTE — ED PROVIDER NOTES
Encounter Date: 10/9/2019       History   No chief complaint on file.    Patient with a history of previous allergic reaction likely secondary Diflucan.  Patient felt she was getting a yeast infection and took Diflucan pill last night.  She woke up with swollen lip this morning.  No ulcers or other skin rashes. No genital or ocular lesions.  Patient denies any tongue or throat swelling.  Patient took Benadryl 50 mg p.o. prior to arrival.        Review of patient's allergies indicates:   Allergen Reactions    Diflucan [fluconazole] Swelling     Past Medical History:   Diagnosis Date    Abnormal Pap smear     Acute nonintractable headache     Arthralgia     Breast disorder     Carpal tunnel syndrome, bilateral     Chronic constipation     Renal calculi 3/25/2013     Past Surgical History:   Procedure Laterality Date    ABDOMINAL SURGERY      breast augmentation  2006    w/breast lift    BREAST SURGERY  2002    Mastitis    COSMETIC SURGERY      HERNIA REPAIR  2005    UMBILICAL HERNIA REPAIR       Family History   Problem Relation Age of Onset    Lupus Mother     Lupus Sister      Social History     Tobacco Use    Smoking status: Former Smoker    Smokeless tobacco: Never Used   Substance Use Topics    Alcohol use: Yes     Comment: occasional    Drug use: No     Review of Systems   Constitutional: Negative for chills and fever.   HENT: Negative for congestion.    Eyes: Negative for visual disturbance.   Respiratory: Negative for shortness of breath.    Cardiovascular: Negative for chest pain and palpitations.   Gastrointestinal: Negative for abdominal pain and vomiting.   Genitourinary: Negative for dysuria.   Musculoskeletal: Negative for joint swelling.   Neurological: Negative for headaches.   Psychiatric/Behavioral: Negative for confusion.       Physical Exam     Initial Vitals [10/09/19 0926]   BP Pulse Resp Temp SpO2   (!) 136/93 79 19 98.9 °F (37.2 °C) 100 %      MAP       --         Physical  Exam    Nursing note and vitals reviewed.  Constitutional: She is not diaphoretic. No distress.   HENT:   Head: Normocephalic and atraumatic.   Nose: Nose normal.   Mouth/Throat: Oropharynx is clear and moist. No oropharyngeal exudate.   Lower lip has some mild edema.  There are no ulcerations.  No ocular lesions.   Eyes: Conjunctivae are normal.   Neck: Normal range of motion.   Cardiovascular: Normal rate.   Pulmonary/Chest: Breath sounds normal.   Abdominal: Soft. There is no tenderness.   Musculoskeletal: Normal range of motion.   Neurological: She is alert.   No gross deficits   Skin: No rash noted.   There is no blisters, bullae, urticaria, petechiae or other rashes   Psychiatric: She has a normal mood and affect.         ED Course   Procedures  Labs Reviewed   POCT URINE PREGNANCY          Imaging Results    None          Medical Decision Making:   History:   Old Medical Records: I decided to obtain old medical records.  Clinical Tests:   Lab Tests: Reviewed  ED Management:  Patient presents with some mild angioedema of the lower lip.  There is no other oropharyngeal involvement.  Patient states she is driving.  Patient already has Benadryl 50 mg p.o..  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  Currently patient has absolutely no blistering or ulcerations consistent with Dey-Larry syndrome.  Discussed and detail to return for any blistering or worsening symptoms. Corticosteroids given here.  Will refer to Dermatology.                      Clinical Impression:       ICD-10-CM ICD-9-CM   1. Angioedema of lips, initial encounter T78.3XXA 995.1                                Joao Stephens MD  10/09/19 0985

## 2020-05-19 ENCOUNTER — OFFICE VISIT (OUTPATIENT)
Dept: PRIMARY CARE CLINIC | Facility: CLINIC | Age: 35
End: 2020-05-19
Payer: COMMERCIAL

## 2020-05-19 DIAGNOSIS — U07.1 COVID-19: Primary | ICD-10-CM

## 2020-05-19 PROCEDURE — 99203 OFFICE O/P NEW LOW 30 MIN: CPT | Mod: S$GLB,,, | Performed by: EMERGENCY MEDICINE

## 2020-05-19 PROCEDURE — U0003 INFECTIOUS AGENT DETECTION BY NUCLEIC ACID (DNA OR RNA); SEVERE ACUTE RESPIRATORY SYNDROME CORONAVIRUS 2 (SARS-COV-2) (CORONAVIRUS DISEASE [COVID-19]), AMPLIFIED PROBE TECHNIQUE, MAKING USE OF HIGH THROUGHPUT TECHNOLOGIES AS DESCRIBED BY CMS-2020-01-R: HCPCS

## 2020-05-19 PROCEDURE — 99203 PR OFFICE/OUTPT VISIT, NEW, LEVL III, 30-44 MIN: ICD-10-PCS | Mod: S$GLB,,, | Performed by: EMERGENCY MEDICINE

## 2020-05-19 NOTE — PATIENT INSTRUCTIONS
Instructions for Patients with Confirmed or Suspected COVID-19    If you are awaiting your test result, you will either be called or it will be released to the patient portal.  If you have any questions about your test, please visit [email or web address] or call our COVID-19 information line at [phone].       Stay home and stay away from family members and friends. The CDC says, you can leave home after these three things have happened: 1) You have had no fever for at least 72 hours (that is three full days of no fever without the use of medicine that reduces fevers) 2) AND other symptoms have improved (for example, when your cough or shortness of breath have improved) 3) AND at least 7 days have passed since your symptoms first appeared.   Separate yourself from other people and animals in your home.   Call ahead before visiting your doctor.   Wear a facemask.   Cover your coughs and sneezes.   Wash your hands often with soap and water; hand  can be used, too.   Avoid sharing personal household items.   Wipe down surfaces used daily.   Monitor your symptoms. Seek prompt medical attention if your illness is worsening (e.g., difficulty breathing).    Before seeking care, call your healthcare provider.   If you have a medical emergency and need to call 911, notify the dispatch personnel that you have, or are being evaluated for COVID-19. If possible, put on a facemask before emergency medical services arrive.        Recommended precautions for household members, intimate partners, and caregivers in a home setting of a patient with symptomatic laboratory-confirmed COVID-19 or a patient under investigation.  Household members, intimate partners, and caregivers in the home setting awaiting tests results have close contact with a person with symptomatic, laboratory-confirmed COVID-19 or a person under investigation. Close contacts should monitor their health; they should call their  provider right away if they develop symptoms suggestive of COVID-19 (e.g., fever, cough, shortness of breath).    Close contacts should also follow these recommendations:   Make sure that you understand and can help the patient follow their provider's instructions for medication(s) and care. You should help the patient with basic needs in the home and provide support for getting groceries, prescriptions, and other personal needs.   Monitor the patient's symptoms. If the patient is getting sicker, call his or her healthcare provider and tell them that the patient has laboratory-confirmed COVID-19. If the patient has a medical emergency and you need to call 911, notify the dispatch personnel that the patient has, or is being evaluated for COVID-19.   Household members should stay in another room or be  from the patient. Household members should use a separate bedroom and bathroom, if available.   Prohibit visitors.   Household members should care for any pets in the home.   Make sure that shared spaces in the home have good air flow, such as by an air conditioner or an opened window, weather permitting.   Perform hand hygiene frequently. Wash your hands often with soap and water for at least 20 seconds or use an alcohol-based hand  (that contains > 60% alcohol) covering all surfaces of your hands and rubbing them together until they feel dry. Soap and water should be used preferentially.   Avoid touching your eyes, nose, and mouth.   The patient should wear a facemask. If the patient is not able to wear a facemask (for example, because it causes trouble breathing), caregivers should wear a mask when they are in the same room as the patient.   Wear a disposable facemask and gloves when you touch or have contact with the patient's blood, stool, or body fluids, such as saliva, sputum, nasal mucus, vomit, urine.  o Throw out disposable facemasks and gloves after using them. Do not  reuse.  o When removing personal protective equipment, first remove and dispose of gloves. Then, immediately clean your hands with soap and water or alcohol-based hand . Next, remove and dispose of facemask, and immediately clean your hands again with soap and water or alcohol-based hand .   You should not share dishes, drinking glasses, cups, eating utensils, towels, bedding, or other items with the patient. After the patient uses these items, you should wash them thoroughly (see below Wash laundry thoroughly).   Clean all high-touch surfaces, such as counters, tabletops, doorknobs, bathroom fixtures, toilets, phones, keyboards, tablets, and bedside tables, every day. Also, clean any surfaces that may have blood, stool, or body fluids on them.   Use a household cleaning spray or wipe, according to the label instructions. Labels contain instructions for safe and effective use of the cleaning product including precautions you should take when applying the product, such as wearing gloves and making sure you have good ventilation during use of the product.   Wash laundry thoroughly.  o Immediately remove and wash clothes or bedding that have blood, stool, or body fluids on them.  o Wear disposable gloves while handling soiled items and keep soiled items away from your body. Clean your hands (with soap and water or an alcohol-based hand ) immediately after removing your gloves.  o Read and follow directions on labels of laundry or clothing items and detergent. In general, using a normal laundry detergent according to washing machine instructions and dry thoroughly using the warmest temperatures recommended on the clothing label.   Place all used disposable gloves, facemasks, and other contaminated items in a lined container before disposing of them with other household waste. Clean your hands (with soap and water or an alcohol-based hand ) immediately after handling these  items. Soap and water should be used preferentially if hands are visibly dirty.   Discuss any additional questions with your state or local health department or healthcare provider. Check available hours when contacting your local health department.    For more information see CDC link below.      https://www.cdc.gov/coronavirus/2019-ncov/hcp/guidance-prevent-spread.html#precautions        Sources:  [facility], [facility] and [facility]

## 2020-05-19 NOTE — PROGRESS NOTES
Subjective:        Time seen by provider: 3:16 PM on 05/19/2020    Pepper Fitzpatrick is a 34 y.o. female who presents for an evaluation of possible COVID-19. 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   No pulmonary PMHx or PSHx.     Review of Systems   Constitutional: Negative for activity change, appetite change, fatigue and fever.   HENT: Negative for congestion, rhinorrhea and sore throat.    Respiratory: Negative for cough, chest tightness, shortness of breath and wheezing.    Cardiovascular: Negative for chest pain and palpitations.   Gastrointestinal: Negative for diarrhea, nausea and vomiting.   Musculoskeletal: Negative for arthralgias and myalgias.   Skin: Negative for rash.   Neurological: Negative for weakness, light-headedness, numbness and headaches.       Objective:      Physical Exam   Constitutional: She is oriented to person, place, and time. She appears well-developed and well-nourished. No distress.   HENT:   Head: Normocephalic and atraumatic.   Nose: Nose normal.   Mouth/Throat: Oropharynx is clear and moist.   Eyes: Conjunctivae are normal.   Neck: Normal range of motion.   Cardiovascular: Normal rate, regular rhythm, normal heart sounds and intact distal pulses.   No murmur heard.  Pulmonary/Chest: Breath sounds normal. No respiratory distress. She has no wheezes.   Musculoskeletal: Normal range of motion.   Neurological: She is alert and oriented to person, place, and time.   Skin: Skin is warm and dry. She is not diaphoretic.   Nursing note and vitals reviewed.      Assessment and Plan:      Diagnoses and all orders for this visit:    COVID-19  -     COVID-19 Routine Screening  - Discharge home and await results.   - Return to clinic or ED for new or worsening symptoms.   - Follow-up with PCP as needed.     Scribe Attestation:   Marlen FAITH, am scribing for, and in the presence of, ARNAUD Jaime  performed the above scribed service and the documentation accurately describes the services I performed. I attest to the accuracy of the note.    I, Samanta Chavira PA-C, personally performed the services described in this documentation. All medical record entries made by the scribe were at my direction and in my presence.  I have reviewed the chart and agree that the record reflects my personal performance and is accurate and complete. Samanta Chavira PA-C.  7:41 PM 05/19/2020

## 2020-05-20 LAB — SARS-COV-2 RNA RESP QL NAA+PROBE: NOT DETECTED

## 2020-09-16 ENCOUNTER — TELEPHONE (OUTPATIENT)
Dept: UROLOGY | Facility: CLINIC | Age: 35
End: 2020-09-16

## 2020-09-16 NOTE — TELEPHONE ENCOUNTER
Spoke with patient she states she thought her appointment was canceled she received a email stating her appointment was canceled. Offered appointment today or tomorrow with  patient declined and states she has to work. Appointment scheduled on Monday 9/21 at 1130am with .

## 2020-09-16 NOTE — TELEPHONE ENCOUNTER
----- Message from Enedina Oropeza MD sent at 9/15/2020 12:12 PM CDT -----  Pt is a new pt  She never signed in  Please make f/u with provider emilie next avail new pt slot  Has stone history

## 2020-09-20 NOTE — PROGRESS NOTES
[facility] Note    PCP: JOLIE Knutson    Chief Complaint: kidney stones    SUBJECTIVE:       History of Present Illness:  Pepper Fitzpatrick is a 34 y.o. female who presents to clinic for kidney stones. She is New  to our clinic.     Has had at least 3 stone episodes, first in 2013. Last episode was 2 weeks ago, pain was on right side lasted 2 days. No dysuria. No frequency. Some small flecks of hematuria. +nausea and vomiting. +sweats and chills, no definite fevers.   Went to urgent care and was given zofran and flomax. Pain resolved however has not definitively seen stone.     Drinks sprite and some iced tea, coffee.   No vitamins or supplements.   Does have a high salt diet. Eats animal protein. No excess spinach, nut, or chocolate intake.     UA today: negative for blood, leuks, nitrite     Last urine culture: Enterococcus (12/7/18)  Last creatinine: 0.64 (9/5/18)    Family  hx: uncertain   Former smoker. Works at hair salon.     Past medical, family, and social history reviewed as documented in chart with pertinent positive medical, family, and social history detailed in HPI.    Review of patient's allergies indicates:   Allergen Reactions    Penicillins Blisters and Swelling    Diflucan [fluconazole] Swelling       Past Medical History:   Diagnosis Date    Abnormal Pap smear     Acute nonintractable headache     Arthralgia     Breast disorder     Carpal tunnel syndrome, bilateral     Chronic constipation     Renal calculi 3/25/2013     Past Surgical History:   Procedure Laterality Date    ABDOMINAL SURGERY      breast augmentation  2006    w/breast lift    BREAST SURGERY  2002    Mastitis    COSMETIC SURGERY      HERNIA REPAIR  2005    UMBILICAL HERNIA REPAIR       Family History   Problem Relation Age of Onset    Lupus Mother     Lupus Sister      Social History     Tobacco Use    Smoking status: Former Smoker    Smokeless tobacco: Never Used  "  Substance Use Topics    Alcohol use: Yes     Comment: occasional    Drug use: No        Review of Systems   Constitutional: Negative for chills and fever.   HENT: Negative for trouble swallowing.    Eyes: Negative for pain.   Respiratory: Negative for cough and shortness of breath.    Cardiovascular: Negative for chest pain and palpitations.   Gastrointestinal: Negative for abdominal pain.   Genitourinary: Positive for flank pain and hematuria. Negative for difficulty urinating, dysuria and urgency.   Musculoskeletal: Positive for back pain.   Skin: Negative for rash.   Neurological: Negative for weakness.   Psychiatric/Behavioral: Negative for behavioral problems.       OBJECTIVE:     Anticoagulation:  no    Estimated body mass index is 25.97 kg/m² as calculated from the following:    Height as of this encounter: 5' 3" (1.6 m).    Weight as of this encounter: 66.5 kg (146 lb 9.7 oz).    Vital Signs (Most Recent)  Temp: 97.5 °F (36.4 °C) (09/21/20 1154)  Pulse: 70 (09/21/20 1154)  Resp: 18 (09/21/20 1154)  BP: 101/64 (09/21/20 1154)    Physical Exam   Vitals reviewed.  Constitutional: She is oriented to person, place, and time. She does not appear ill. No distress.   HENT:   Head: Normocephalic and atraumatic.   Eyes: No scleral icterus.   Cardiovascular: Normal rate and regular rhythm.    Pulmonary/Chest: Effort normal. No respiratory distress.   Abdominal: Soft. Normal appearance. She exhibits no distension.   No CVA tenderness bilaterally    Neurological: She is alert and oriented to person, place, and time.   Psychiatric: Her behavior is normal. Mood normal.       BMP  Lab Results   Component Value Date     09/05/2018    K 3.8 09/05/2018     09/05/2018    CO2 28 09/05/2018    BUN 14 09/05/2018    CREATININE 0.64 09/05/2018    CALCIUM 9.4 09/05/2018    ANIONGAP 9 03/26/2013    ESTGFRAFRICA 135 09/05/2018    EGFRNONAA 116 09/05/2018       Lab Results   Component Value Date    WBC 5.0 09/05/2018    " HGB 13.6 09/05/2018    HCT 40.2 09/05/2018    MCV 91.0 09/05/2018     09/05/2018     Imaging:  CTRSS 3/25/13: personally reviewed  3-mm right UVJ stone with mild hydronephrosis.  There is a 3-mm and a 1 mm stone in the right kidney and a 2-mm and a 1 mm stone in the left kidney.   Stones are difficult to visualize on KUB    ASSESSMENT     1. Renal calculi    2. Abdominal pain, unspecified abdominal location        PLAN:     1. Renal stones  - CTRSS   - Stone basket provided, continue to strain urine  - Patient is very interested in stone prevention and is scared to have any further stone episodes  - We discussed depending on her stone burden we could perform URS to rid her of existing stones and then would have to focus on prevention of further stones  - General risk factors for kidney stones and the conservative measures to prevent kidney stones in the future were discussed with the patient in detail.  The patient was encouraged to drink 2-3 liters of water a day, limit iced tea and connie as well as foods high in oxalate.  They were cautioned to try to limit salt and red meat intake.  We also discussed adding citrate to the diet with the addition of braydon or lemon juice to their water or alternatively with crystal light.   - Depending on stone analysis we can further tailor this  - Also discussed 24 hour urine 6 weeks after this acute episode   - Will call with CT results     Adele Charlton MD

## 2020-09-21 ENCOUNTER — OFFICE VISIT (OUTPATIENT)
Dept: UROLOGY | Facility: CLINIC | Age: 35
End: 2020-09-21
Payer: COMMERCIAL

## 2020-09-21 VITALS
RESPIRATION RATE: 18 BRPM | SYSTOLIC BLOOD PRESSURE: 101 MMHG | DIASTOLIC BLOOD PRESSURE: 64 MMHG | WEIGHT: 146.63 LBS | HEIGHT: 63 IN | HEART RATE: 70 BPM | TEMPERATURE: 98 F | BODY MASS INDEX: 25.98 KG/M2

## 2020-09-21 DIAGNOSIS — N20.0 RENAL CALCULI: Primary | ICD-10-CM

## 2020-09-21 DIAGNOSIS — R10.9 ABDOMINAL PAIN, UNSPECIFIED ABDOMINAL LOCATION: ICD-10-CM

## 2020-09-21 LAB
BILIRUB SERPL-MCNC: NORMAL MG/DL
BLOOD URINE, POC: NORMAL
CLARITY, POC UA: CLEAR
COLOR, POC UA: YELLOW
GLUCOSE UR QL STRIP: NORMAL
KETONES UR QL STRIP: NORMAL
LEUKOCYTE ESTERASE URINE, POC: NORMAL
NITRITE, POC UA: NORMAL
PH, POC UA: 6
PROTEIN, POC: NORMAL
SPECIFIC GRAVITY, POC UA: 1.03
UROBILINOGEN, POC UA: 0.2

## 2020-09-21 PROCEDURE — 99999 PR PBB SHADOW E&M-EST. PATIENT-LVL III: ICD-10-PCS | Mod: PBBFAC,,, | Performed by: STUDENT IN AN ORGANIZED HEALTH CARE EDUCATION/TRAINING PROGRAM

## 2020-09-21 PROCEDURE — 99204 OFFICE O/P NEW MOD 45 MIN: CPT | Mod: 25,S$GLB,, | Performed by: STUDENT IN AN ORGANIZED HEALTH CARE EDUCATION/TRAINING PROGRAM

## 2020-09-21 PROCEDURE — 81002 POCT URINE DIPSTICK WITHOUT MICROSCOPE: ICD-10-PCS | Mod: S$GLB,,, | Performed by: STUDENT IN AN ORGANIZED HEALTH CARE EDUCATION/TRAINING PROGRAM

## 2020-09-21 PROCEDURE — 99999 PR PBB SHADOW E&M-EST. PATIENT-LVL III: CPT | Mod: PBBFAC,,, | Performed by: STUDENT IN AN ORGANIZED HEALTH CARE EDUCATION/TRAINING PROGRAM

## 2020-09-21 PROCEDURE — 81002 URINALYSIS NONAUTO W/O SCOPE: CPT | Mod: S$GLB,,, | Performed by: STUDENT IN AN ORGANIZED HEALTH CARE EDUCATION/TRAINING PROGRAM

## 2020-09-21 PROCEDURE — 99204 PR OFFICE/OUTPT VISIT, NEW, LEVL IV, 45-59 MIN: ICD-10-PCS | Mod: 25,S$GLB,, | Performed by: STUDENT IN AN ORGANIZED HEALTH CARE EDUCATION/TRAINING PROGRAM

## 2020-09-21 PROCEDURE — 3008F BODY MASS INDEX DOCD: CPT | Mod: CPTII,S$GLB,, | Performed by: STUDENT IN AN ORGANIZED HEALTH CARE EDUCATION/TRAINING PROGRAM

## 2020-09-21 PROCEDURE — 3008F PR BODY MASS INDEX (BMI) DOCUMENTED: ICD-10-PCS | Mod: CPTII,S$GLB,, | Performed by: STUDENT IN AN ORGANIZED HEALTH CARE EDUCATION/TRAINING PROGRAM

## 2020-09-21 RX ORDER — DEXTROAMPHETAMINE SACCHARATE, AMPHETAMINE ASPARTATE, DEXTROAMPHETAMINE SULFATE AND AMPHETAMINE SULFATE 5; 5; 5; 5 MG/1; MG/1; MG/1; MG/1
TABLET ORAL
COMMUNITY
Start: 2020-08-31 | End: 2023-01-12 | Stop reason: ALTCHOICE

## 2020-09-21 RX ORDER — BUPRENORPHINE AND NALOXONE 8; 2 MG/1; MG/1
FILM, SOLUBLE BUCCAL; SUBLINGUAL
COMMUNITY
Start: 2020-08-25

## 2020-09-23 ENCOUNTER — HOSPITAL ENCOUNTER (OUTPATIENT)
Dept: RADIOLOGY | Facility: HOSPITAL | Age: 35
Discharge: HOME | End: 2020-09-23
Attending: STUDENT IN AN ORGANIZED HEALTH CARE EDUCATION/TRAINING PROGRAM
Payer: COMMERCIAL

## 2020-09-23 DIAGNOSIS — R10.9 ABDOMINAL PAIN, UNSPECIFIED ABDOMINAL LOCATION: ICD-10-CM

## 2020-09-23 PROCEDURE — 74176 CT ABD & PELVIS W/O CONTRAST: CPT | Mod: TC

## 2020-09-25 ENCOUNTER — TELEPHONE (OUTPATIENT)
Dept: UROLOGY | Facility: CLINIC | Age: 35
End: 2020-09-25

## 2020-09-25 NOTE — TELEPHONE ENCOUNTER
Pt called in regards of her ct scan results. Pt stated that she would like to discuss surgery or management depending on  recommendations. Pt asked for an appointment next week. So I scheduled her for Monday to see  in the clinic.

## 2020-09-25 NOTE — TELEPHONE ENCOUNTER
----- Message from Thelma Levin sent at 9/24/2020 10:13 AM CDT -----  Contact: pt  Pt states that she got a call yesterday afternoon from the office regarding results of a CT Scan she had done.So she is returning the nurse call..[phone]

## 2020-09-30 ENCOUNTER — OFFICE VISIT (OUTPATIENT)
Dept: UROLOGY | Facility: CLINIC | Age: 35
End: 2020-09-30
Payer: COMMERCIAL

## 2020-09-30 VITALS
HEART RATE: 98 BPM | DIASTOLIC BLOOD PRESSURE: 80 MMHG | SYSTOLIC BLOOD PRESSURE: 111 MMHG | WEIGHT: 148.06 LBS | BODY MASS INDEX: 26.23 KG/M2 | HEIGHT: 63 IN

## 2020-09-30 DIAGNOSIS — N20.0 RENAL CALCULI: Primary | ICD-10-CM

## 2020-09-30 DIAGNOSIS — N20.0 LEFT RENAL STONE: ICD-10-CM

## 2020-09-30 PROCEDURE — 99214 OFFICE O/P EST MOD 30 MIN: CPT | Mod: S$GLB,,, | Performed by: STUDENT IN AN ORGANIZED HEALTH CARE EDUCATION/TRAINING PROGRAM

## 2020-09-30 PROCEDURE — 99999 PR PBB SHADOW E&M-EST. PATIENT-LVL IV: CPT | Mod: PBBFAC,,, | Performed by: STUDENT IN AN ORGANIZED HEALTH CARE EDUCATION/TRAINING PROGRAM

## 2020-09-30 PROCEDURE — 3008F PR BODY MASS INDEX (BMI) DOCUMENTED: ICD-10-PCS | Mod: CPTII,S$GLB,, | Performed by: STUDENT IN AN ORGANIZED HEALTH CARE EDUCATION/TRAINING PROGRAM

## 2020-09-30 PROCEDURE — 3008F BODY MASS INDEX DOCD: CPT | Mod: CPTII,S$GLB,, | Performed by: STUDENT IN AN ORGANIZED HEALTH CARE EDUCATION/TRAINING PROGRAM

## 2020-09-30 PROCEDURE — 99214 PR OFFICE/OUTPT VISIT, EST, LEVL IV, 30-39 MIN: ICD-10-PCS | Mod: S$GLB,,, | Performed by: STUDENT IN AN ORGANIZED HEALTH CARE EDUCATION/TRAINING PROGRAM

## 2020-09-30 PROCEDURE — 99999 PR PBB SHADOW E&M-EST. PATIENT-LVL IV: ICD-10-PCS | Mod: PBBFAC,,, | Performed by: STUDENT IN AN ORGANIZED HEALTH CARE EDUCATION/TRAINING PROGRAM

## 2020-09-30 RX ORDER — DEXTROAMPHETAMINE SACCHARATE, AMPHETAMINE ASPARTATE, DEXTROAMPHETAMINE SULFATE AND AMPHETAMINE SULFATE 7.5; 7.5; 7.5; 7.5 MG/1; MG/1; MG/1; MG/1
TABLET ORAL
COMMUNITY
Start: 2020-09-28 | End: 2022-10-21

## 2020-09-30 NOTE — PROGRESS NOTES
[facility] Note    PCP: JOLIE Knutson    Chief Complaint: kidney stones    SUBJECTIVE:       History of Present Illness:  Pepper Fitzpatrick is a 34 y.o. female who presents to clinic for kidney stones. She is Established  to our clinic.     Returns today to discuss CT results. 2 mm right lower pole stone. 4 stones in left kidney. Still having some tenderness on right but much improved.   She is interested in having surgery to remove further stones as she does not want to have any more stone episodes.     9/21/20  Has had at least 3 stone episodes, first in 2013. Last episode was 2 weeks ago, pain was on right side lasted 2 days. No dysuria. No frequency. Some small flecks of hematuria. +nausea and vomiting. +sweats and chills, no definite fevers.   Went to urgent care and was given zofran and flomax. Pain resolved however has not definitively seen stone.     Drinks sprite and some iced tea, coffee.   No vitamins or supplements.   Does have a high salt diet. Eats animal protein. No excess spinach, nut, or chocolate intake.     UA today: negative for blood, leuks, nitrite     Last urine culture: Enterococcus (12/7/18)  Last creatinine: 0.64 (9/5/18)    Family  hx: uncertain   Former smoker. Works at hair salon.     Past medical, family, and social history reviewed as documented in chart with pertinent positive medical, family, and social history detailed in HPI.    Review of patient's allergies indicates:   Allergen Reactions    Penicillins Blisters and Swelling    Diflucan [fluconazole] Swelling       Past Medical History:   Diagnosis Date    Abnormal Pap smear     Acute nonintractable headache     Arthralgia     Breast disorder     Carpal tunnel syndrome, bilateral     Chronic constipation     Renal calculi 3/25/2013     Past Surgical History:   Procedure Laterality Date    ABDOMINAL SURGERY      breast augmentation  2006    w/breast lift    BREAST SURGERY  2002  "   Mastitis    COSMETIC SURGERY      HERNIA REPAIR  2005    UMBILICAL HERNIA REPAIR       Family History   Problem Relation Age of Onset    Lupus Mother     Lupus Sister      Social History     Tobacco Use    Smoking status: Former Smoker    Smokeless tobacco: Never Used   Substance Use Topics    Alcohol use: Yes     Comment: occasional    Drug use: No        Review of Systems   Constitutional: Negative for chills and fever.   HENT: Negative for trouble swallowing.    Eyes: Negative for pain.   Respiratory: Negative for cough and shortness of breath.    Cardiovascular: Negative for chest pain and palpitations.   Gastrointestinal: Negative for abdominal pain, nausea and vomiting.   Genitourinary: Negative for difficulty urinating, dysuria, flank pain, hematuria and urgency.   Musculoskeletal: Positive for back pain.   Skin: Negative for rash.   Neurological: Negative for weakness.   Psychiatric/Behavioral: Negative for behavioral problems.       OBJECTIVE:     Anticoagulation:  no    Estimated body mass index is 26.22 kg/m² as calculated from the following:    Height as of this encounter: 5' 3" (1.6 m).    Weight as of this encounter: 67.1 kg (148 lb 0.6 oz).    Vital Signs (Most Recent)  Pulse: 98 (09/30/20 1558)  BP: 111/80 (09/30/20 1558)    Physical Exam   Nursing note and vitals reviewed.  Constitutional: She is oriented to person, place, and time. She does not appear ill. No distress.   HENT:   Head: Normocephalic and atraumatic.   Eyes: No scleral icterus.   Cardiovascular: Normal rate and regular rhythm.    Pulmonary/Chest: Effort normal. No respiratory distress.   Abdominal: Soft. Normal appearance. She exhibits no distension.   No CVA tenderness bilaterally    Neurological: She is alert and oriented to person, place, and time.   Skin: Skin is warm and dry. No jaundice.     Psychiatric: Her behavior is normal. Mood normal.       BMP  Lab Results   Component Value Date     09/05/2018    K 3.8 " 09/05/2018     09/05/2018    CO2 28 09/05/2018    BUN 14 09/05/2018    CREATININE 0.64 09/05/2018    CALCIUM 9.4 09/05/2018    ANIONGAP 9 03/26/2013    ESTGFRAFRICA 135 09/05/2018    EGFRNONAA 116 09/05/2018       Lab Results   Component Value Date    WBC 5.0 09/05/2018    HGB 13.6 09/05/2018    HCT 40.2 09/05/2018    MCV 91.0 09/05/2018     09/05/2018     Imaging:  CTRSS 3/25/13: personally reviewed  3-mm right UVJ stone with mild hydronephrosis.  There is a 3-mm and a 1 mm stone in the right kidney and a 2-mm and a 1 mm stone in the left kidney.   Stones are difficult to visualize on KUB    CTRSS 9/23/20: personally reviewed  PER HPI    ASSESSMENT     1. Renal calculi    2. Left renal stone        PLAN:     1. Renal stones  - She is very interested in surgery as she has a terrible time passing stones  - We discussed that it is not necessary for her to undergo any procedure as all the stones she has would be passable however if she states that she does not want to go through another stone episode  - We discussed that she would likely have a stent afterward and this may cause some discomfort   - Discussed ESWL vs URS. Recommended ureteroscopy as this is the best way to get her stone free  - metabolic workup: bmp, uric acid, PTH  - after stone analysis can further tailor treatment for stone prevention  - General risk factors for kidney stones and the conservative measures to prevent kidney stones in the future were discussed with the patient in detail.  The patient was encouraged to drink 2-3 liters of water a day, limit iced tea and connie as well as foods high in oxalate.  They were cautioned to try to limit salt and red meat intake.  We also discussed adding citrate to the diet with the addition of braydon or lemon juice to their water or alternatively with crystal light.   - I have explained the indication, risks, benefits, and alternatives of the procedure in detail.  Risks including but not limited  to bleeding, infection, injury to the urethra, bladder, ureter, need for additional treatments, inability or incomplete removal of kidney stones, pain, and discomfort related to the stent were discussed in depth with the patient.  The patient voices understanding and all questions have been answered.  The patient agrees to proceed as planned with left ureteroscopy, laser lithotripsy, and ureteral stent placement.  - COVID pre op, urine culture 1 week pre op   - will also obtain 24 hour urine 6 weeks post op    Adele Charlton MD

## 2020-10-12 ENCOUNTER — TELEPHONE (OUTPATIENT)
Dept: UROLOGY | Facility: CLINIC | Age: 35
End: 2020-10-12

## 2020-10-12 NOTE — TELEPHONE ENCOUNTER
Called and spoke to patient who states that she need to cancel procedure because she has to child situated with another appt and will call the office back to rescheduled. Pre-op, labs and covid cancelled. Procedure placed in depot.

## 2020-10-12 NOTE — TELEPHONE ENCOUNTER
----- Message from Sylvia Duggan sent at 10/12/2020 10:19 AM CDT -----  Regarding: procedure  Contact: pt  Patient called to cancel her Procedure that is schedule for 10/28/20  Patient will call back to reschedule.      Call back [phone]

## 2020-11-03 ENCOUNTER — TELEPHONE (OUTPATIENT)
Dept: UROLOGY | Facility: CLINIC | Age: 35
End: 2020-11-03

## 2020-11-03 NOTE — TELEPHONE ENCOUNTER
Spoke w pt informed for best practice that Dr Charlton and Dr Garrison are in the same practice and second opionions are not given within our office she would have to seek care in another urologist office pt voiced ok and understanding. And will call to reschedule with Dr Charlton.

## 2020-11-03 NOTE — TELEPHONE ENCOUNTER
Called patient to discuss surgery.   She is currently asymptomatic. All right flank pain has resolved however she is still very worried about having another stone episode in the future.     We discussed options for management of her low volume stone burden.   1) observation with repeat US/KUB in 1 year  2) EWSL  3) URS    I discussed with her the risks and benefits of each including further stone episodes if we choose observation, however we could intervene immediately if desired, do not need to undergo trial of passage and could take care of remaining stone burden at that time.     We discussed ESWL vs URS and how with ESWL there are fewer complications however less chance of being stone free which is ultimately her goal.     As she is currently asymptomatic I did recommend observation with more urgent intervention if she were to have another stone episode.     She is interested in further workup for stone prevention. I recommended a 24 hour urine as well as blood work and she would like to have this done. Will order and have her see me after for discussion of the results.     Adele Charlton MD  Urology Department

## 2020-11-03 NOTE — TELEPHONE ENCOUNTER
----- Message from Simon Harris sent at 11/3/2020  3:30 PM CST -----  Regarding: second opinion  Type:  Sooner Apoointment Request    Caller is requesting a sooner appointment.  Caller declined first available appointment listed below.  Caller will not accept being placed on the waitlist and is requesting a message be sent to doctor.    Name of Caller:  pt  When is the first available appointment?  1/4/2021 9am  Symptoms:  kidney stones  Best Call Back Number:  [phone]   Additional Information:  pt is looking to have surgery but wants to have second opinion first, pt has CT done already.

## 2020-11-09 ENCOUNTER — LAB VISIT (OUTPATIENT)
Dept: LAB | Facility: HOSPITAL | Age: 35
End: 2020-11-09
Attending: STUDENT IN AN ORGANIZED HEALTH CARE EDUCATION/TRAINING PROGRAM
Payer: COMMERCIAL

## 2020-11-09 DIAGNOSIS — N20.0 RENAL CALCULI: ICD-10-CM

## 2020-11-09 LAB
ANION GAP SERPL CALC-SCNC: 7 MMOL/L (ref 8–16)
BUN SERPL-MCNC: 15 MG/DL (ref 6–20)
CALCIUM SERPL-MCNC: 8.9 MG/DL (ref 8.7–10.5)
CHLORIDE SERPL-SCNC: 106 MMOL/L (ref 95–110)
CO2 SERPL-SCNC: 29 MMOL/L (ref 23–29)
CREAT SERPL-MCNC: 0.7 MG/DL (ref 0.5–1.4)
EST. GFR  (AFRICAN AMERICAN): >60 ML/MIN/1.73 M^2
EST. GFR  (NON AFRICAN AMERICAN): >60 ML/MIN/1.73 M^2
GLUCOSE SERPL-MCNC: 72 MG/DL (ref 70–110)
POTASSIUM SERPL-SCNC: 3.7 MMOL/L (ref 3.5–5.1)
PTH-INTACT SERPL-MCNC: 71.1 PG/ML (ref 9–77)
SODIUM SERPL-SCNC: 142 MMOL/L (ref 136–145)
URATE SERPL-MCNC: 3.6 MG/DL (ref 2.4–5.7)

## 2020-11-09 PROCEDURE — 83970 ASSAY OF PARATHORMONE: CPT

## 2020-11-09 PROCEDURE — 36415 COLL VENOUS BLD VENIPUNCTURE: CPT

## 2020-11-09 PROCEDURE — 84550 ASSAY OF BLOOD/URIC ACID: CPT

## 2020-11-09 PROCEDURE — 80048 BASIC METABOLIC PNL TOTAL CA: CPT

## 2020-11-25 ENCOUNTER — PATIENT MESSAGE (OUTPATIENT)
Dept: UROLOGY | Facility: CLINIC | Age: 35
End: 2020-11-25

## 2021-05-06 ENCOUNTER — PATIENT MESSAGE (OUTPATIENT)
Dept: RESEARCH | Facility: HOSPITAL | Age: 36
End: 2021-05-06

## 2022-10-21 ENCOUNTER — OFFICE VISIT (OUTPATIENT)
Dept: URGENT CARE | Facility: CLINIC | Age: 37
End: 2022-10-21
Payer: COMMERCIAL

## 2022-10-21 VITALS
TEMPERATURE: 100 F | HEIGHT: 64 IN | OXYGEN SATURATION: 98 % | BODY MASS INDEX: 25.1 KG/M2 | DIASTOLIC BLOOD PRESSURE: 99 MMHG | RESPIRATION RATE: 20 BRPM | HEART RATE: 83 BPM | SYSTOLIC BLOOD PRESSURE: 135 MMHG | WEIGHT: 147 LBS

## 2022-10-21 DIAGNOSIS — Z11.52 ENCOUNTER FOR SCREENING FOR COVID-19: Primary | ICD-10-CM

## 2022-10-21 LAB
CTP QC/QA: YES
FLUAV AG NPH QL: NEGATIVE
FLUBV AG NPH QL: NEGATIVE
S PYO RRNA THROAT QL PROBE: NEGATIVE
SARS-COV-2 AG RESP QL IA.RAPID: NEGATIVE

## 2022-10-21 PROCEDURE — 99204 PR OFFICE/OUTPT VISIT, NEW, LEVL IV, 45-59 MIN: ICD-10-PCS | Mod: S$GLB,,, | Performed by: NURSE PRACTITIONER

## 2022-10-21 PROCEDURE — 87880 STREP A ASSAY W/OPTIC: CPT | Mod: QW,,, | Performed by: NURSE PRACTITIONER

## 2022-10-21 PROCEDURE — 3080F DIAST BP >= 90 MM HG: CPT | Mod: CPTII,S$GLB,, | Performed by: NURSE PRACTITIONER

## 2022-10-21 PROCEDURE — 1159F MED LIST DOCD IN RCRD: CPT | Mod: CPTII,S$GLB,, | Performed by: NURSE PRACTITIONER

## 2022-10-21 PROCEDURE — 87804 POCT INFLUENZA A/B: ICD-10-PCS | Mod: 59,QW,, | Performed by: NURSE PRACTITIONER

## 2022-10-21 PROCEDURE — 3075F SYST BP GE 130 - 139MM HG: CPT | Mod: CPTII,S$GLB,, | Performed by: NURSE PRACTITIONER

## 2022-10-21 PROCEDURE — 87804 INFLUENZA ASSAY W/OPTIC: CPT | Mod: QW,,, | Performed by: NURSE PRACTITIONER

## 2022-10-21 PROCEDURE — 1159F PR MEDICATION LIST DOCUMENTED IN MEDICAL RECORD: ICD-10-PCS | Mod: CPTII,S$GLB,, | Performed by: NURSE PRACTITIONER

## 2022-10-21 PROCEDURE — 87811 SARS CORONAVIRUS 2 ANTIGEN POCT, MANUAL READ: ICD-10-PCS | Mod: QW,S$GLB,, | Performed by: NURSE PRACTITIONER

## 2022-10-21 PROCEDURE — 1160F PR REVIEW ALL MEDS BY PRESCRIBER/CLIN PHARMACIST DOCUMENTED: ICD-10-PCS | Mod: CPTII,S$GLB,, | Performed by: NURSE PRACTITIONER

## 2022-10-21 PROCEDURE — 87880 POCT RAPID STREP A: ICD-10-PCS | Mod: QW,,, | Performed by: NURSE PRACTITIONER

## 2022-10-21 PROCEDURE — 1160F RVW MEDS BY RX/DR IN RCRD: CPT | Mod: CPTII,S$GLB,, | Performed by: NURSE PRACTITIONER

## 2022-10-21 PROCEDURE — 87811 SARS-COV-2 COVID19 W/OPTIC: CPT | Mod: QW,S$GLB,, | Performed by: NURSE PRACTITIONER

## 2022-10-21 PROCEDURE — 3075F PR MOST RECENT SYSTOLIC BLOOD PRESS GE 130-139MM HG: ICD-10-PCS | Mod: CPTII,S$GLB,, | Performed by: NURSE PRACTITIONER

## 2022-10-21 PROCEDURE — 99204 OFFICE O/P NEW MOD 45 MIN: CPT | Mod: S$GLB,,, | Performed by: NURSE PRACTITIONER

## 2022-10-21 PROCEDURE — 3080F PR MOST RECENT DIASTOLIC BLOOD PRESSURE >= 90 MM HG: ICD-10-PCS | Mod: CPTII,S$GLB,, | Performed by: NURSE PRACTITIONER

## 2022-10-21 NOTE — PATIENT INSTRUCTIONS
Tylenol/Ibuprofen as directed for fever/body aches  Increase fluid intake  Follow up if you develop symptoms

## 2022-10-21 NOTE — PROGRESS NOTES
"Subjective:       Patient ID: Pepper Fitzpatrick is a 36 y.o. female.    Vitals:  height is 5' 3.5" (1.613 m) and weight is 66.7 kg (147 lb). Her temperature is 99.5 °F (37.5 °C). Her blood pressure is 135/99 (abnormal) and her pulse is 83. Her respiration is 20 and oxygen saturation is 98%.     Chief Complaint: COVID-19 Concerns (/)    Pt states she is being seen because her kids are sick, and she wants to be checked     Constitution: Positive for fatigue.     Objective:      Physical Exam   Constitutional: She is oriented to person, place, and time.   HENT:   Head: Normocephalic and atraumatic.   Ears:   Right Ear: Tympanic membrane, external ear and ear canal normal.   Left Ear: Tympanic membrane, external ear and ear canal normal.   Nose: Nose normal.   Mouth/Throat: Mucous membranes are moist.   Eyes: Conjunctivae are normal. Extraocular movement intact   Neck: Neck supple.   Cardiovascular: Normal rate, regular rhythm, normal heart sounds and normal pulses.   Pulmonary/Chest: Effort normal and breath sounds normal.   Abdominal: Normal appearance and bowel sounds are normal. Soft.   Musculoskeletal: Normal range of motion.         General: Normal range of motion.   Neurological: She is alert and oriented to person, place, and time.   Skin: Skin is warm and dry. Capillary refill takes 2 to 3 seconds.   Psychiatric: Her behavior is normal. Mood normal.   Nursing note and vitals reviewed.      Assessment:       1. Encounter for screening for COVID-19      Covid antigen: Negative    Influenza A/B: Negative    Strep A: Negative    Plan:         Encounter for screening for COVID-19  -     POCT Influenza A/B  -     SARS Coronavirus 2 Antigen, POCT Manual Read  -     POCT rapid strep A       Tylenol/Ibuprofen as directed for fever/body aches  Increase fluid intake  Follow up if you develop symptoms            "

## 2023-01-12 ENCOUNTER — OFFICE VISIT (OUTPATIENT)
Dept: FAMILY MEDICINE | Facility: CLINIC | Age: 38
End: 2023-01-12
Payer: COMMERCIAL

## 2023-01-12 VITALS
SYSTOLIC BLOOD PRESSURE: 102 MMHG | DIASTOLIC BLOOD PRESSURE: 80 MMHG | BODY MASS INDEX: 25.78 KG/M2 | WEIGHT: 151 LBS | HEART RATE: 80 BPM | HEIGHT: 64 IN

## 2023-01-12 DIAGNOSIS — Z00.00 ANNUAL PHYSICAL EXAM: Primary | ICD-10-CM

## 2023-01-12 DIAGNOSIS — M54.6 CHRONIC LEFT-SIDED THORACIC BACK PAIN: ICD-10-CM

## 2023-01-12 DIAGNOSIS — F11.21 OPIOID DEPENDENCE IN REMISSION: ICD-10-CM

## 2023-01-12 DIAGNOSIS — F90.0 ATTENTION DEFICIT HYPERACTIVITY DISORDER (ADHD), PREDOMINANTLY INATTENTIVE TYPE: ICD-10-CM

## 2023-01-12 DIAGNOSIS — G56.03 BILATERAL CARPAL TUNNEL SYNDROME: ICD-10-CM

## 2023-01-12 DIAGNOSIS — G89.29 CHRONIC LEFT-SIDED THORACIC BACK PAIN: ICD-10-CM

## 2023-01-12 DIAGNOSIS — E55.9 VITAMIN D DEFICIENCY: ICD-10-CM

## 2023-01-12 DIAGNOSIS — Z11.59 NEED FOR HEPATITIS C SCREENING TEST: ICD-10-CM

## 2023-01-12 PROCEDURE — 3079F PR MOST RECENT DIASTOLIC BLOOD PRESSURE 80-89 MM HG: ICD-10-PCS | Mod: CPTII,S$GLB,, | Performed by: NURSE PRACTITIONER

## 2023-01-12 PROCEDURE — 1160F RVW MEDS BY RX/DR IN RCRD: CPT | Mod: CPTII,S$GLB,, | Performed by: NURSE PRACTITIONER

## 2023-01-12 PROCEDURE — 99385 PREV VISIT NEW AGE 18-39: CPT | Mod: S$GLB,,, | Performed by: NURSE PRACTITIONER

## 2023-01-12 PROCEDURE — 3008F BODY MASS INDEX DOCD: CPT | Mod: CPTII,S$GLB,, | Performed by: NURSE PRACTITIONER

## 2023-01-12 PROCEDURE — 1159F MED LIST DOCD IN RCRD: CPT | Mod: CPTII,S$GLB,, | Performed by: NURSE PRACTITIONER

## 2023-01-12 PROCEDURE — 1160F PR REVIEW ALL MEDS BY PRESCRIBER/CLIN PHARMACIST DOCUMENTED: ICD-10-PCS | Mod: CPTII,S$GLB,, | Performed by: NURSE PRACTITIONER

## 2023-01-12 PROCEDURE — 3008F PR BODY MASS INDEX (BMI) DOCUMENTED: ICD-10-PCS | Mod: CPTII,S$GLB,, | Performed by: NURSE PRACTITIONER

## 2023-01-12 PROCEDURE — 3074F SYST BP LT 130 MM HG: CPT | Mod: CPTII,S$GLB,, | Performed by: NURSE PRACTITIONER

## 2023-01-12 PROCEDURE — 99385 PR PREVENTIVE VISIT,NEW,18-39: ICD-10-PCS | Mod: S$GLB,,, | Performed by: NURSE PRACTITIONER

## 2023-01-12 PROCEDURE — 1159F PR MEDICATION LIST DOCUMENTED IN MEDICAL RECORD: ICD-10-PCS | Mod: CPTII,S$GLB,, | Performed by: NURSE PRACTITIONER

## 2023-01-12 PROCEDURE — 3074F PR MOST RECENT SYSTOLIC BLOOD PRESSURE < 130 MM HG: ICD-10-PCS | Mod: CPTII,S$GLB,, | Performed by: NURSE PRACTITIONER

## 2023-01-12 PROCEDURE — 3079F DIAST BP 80-89 MM HG: CPT | Mod: CPTII,S$GLB,, | Performed by: NURSE PRACTITIONER

## 2023-01-12 RX ORDER — LISDEXAMFETAMINE DIMESYLATE 40 MG/1
40 CAPSULE ORAL EVERY MORNING
Qty: 30 CAPSULE | Refills: 0 | Status: SHIPPED | OUTPATIENT
Start: 2023-01-12 | End: 2023-02-13 | Stop reason: SDUPTHER
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
## 2023-01-13 ENCOUNTER — TELEPHONE (OUTPATIENT)
Dept: FAMILY MEDICINE | Facility: CLINIC | Age: 38
End: 2023-01-13

## 2023-01-13 NOTE — LETTER
[address] [facility] [address] [address]  [place] [address]  Phone: [phone]   [phone]                        MD Maria Del Rosario Hernandez MD Chequita Williams, MD Matthew Bassett, PAFLORECITA Juarez, STEVO Willard, STEVO Alcala, STEVO Scott PA-C      Date: 01/13/2023        Patient: Pepper Fitzpatrick  YOB: 1985    Please sent over patients office notes.         Sincerely,     Juliana Babb LPN

## 2023-01-13 NOTE — TELEPHONE ENCOUNTER
----- Message from Gudelia Willard NP sent at 1/12/2023 10:11 PM CST -----  Please request pap from Dr. Franck Cruz and office notes from Dr. Pancho Lucio, ortho

## 2023-01-17 ENCOUNTER — TELEPHONE (OUTPATIENT)
Dept: FAMILY MEDICINE | Facility: CLINIC | Age: 38
End: 2023-01-17

## 2023-01-17 NOTE — TELEPHONE ENCOUNTER
----- Message from Felicitas Mckee MA sent at 1/17/2023 11:54 AM CST -----  Regarding: PA  Pt calling stating her pharmacy just told her she needs a PA for her vyvanse. # [phone]

## 2023-01-26 ENCOUNTER — TELEPHONE (OUTPATIENT)
Dept: FAMILY MEDICINE | Facility: CLINIC | Age: 38
End: 2023-01-26

## 2023-02-07 LAB
HCV AB S/CO SERPL IA: 0.05
HCV AB SERPL QL IA: NORMAL
VIT B12 SERPL-MCNC: 387 PG/ML (ref 200–1100)

## 2023-02-08 LAB
25(OH)D3 SERPL-MCNC: 18 NG/ML (ref 30–100)
ALBUMIN SERPL-MCNC: 4.3 G/DL (ref 3.6–5.1)
ALBUMIN/GLOB SERPL: 1.9 (CALC) (ref 1–2.5)
ALP SERPL-CCNC: 50 U/L (ref 31–125)
ALT SERPL-CCNC: 12 U/L (ref 6–29)
ANA SER QL IF: NEGATIVE
AST SERPL-CCNC: 17 U/L (ref 10–30)
BASOPHILS # BLD AUTO: 50 CELLS/UL (ref 0–200)
BASOPHILS NFR BLD AUTO: 0.9 %
BILIRUB SERPL-MCNC: 0.9 MG/DL (ref 0.2–1.2)
BUN SERPL-MCNC: 18 MG/DL (ref 7–25)
BUN/CREAT SERPL: NORMAL (CALC) (ref 6–22)
CALCIUM SERPL-MCNC: 9.2 MG/DL (ref 8.6–10.2)
CCP IGG SERPL-ACNC: <16 UNITS
CHLORIDE SERPL-SCNC: 105 MMOL/L (ref 98–110)
CHOLEST SERPL-MCNC: 148 MG/DL
CHOLEST/HDLC SERPL: 3 (CALC)
CO2 SERPL-SCNC: 31 MMOL/L (ref 20–32)
CREAT SERPL-MCNC: 0.72 MG/DL (ref 0.5–0.97)
EGFR: 109 ML/MIN/1.73M2
EOSINOPHIL # BLD AUTO: 132 CELLS/UL (ref 15–500)
EOSINOPHIL NFR BLD AUTO: 2.4 %
ERYTHROCYTE [DISTWIDTH] IN BLOOD BY AUTOMATED COUNT: 12.2 % (ref 11–15)
ERYTHROCYTE [SEDIMENTATION RATE] IN BLOOD BY WESTERGREN METHOD: 2 MM/H
GLOBULIN SER CALC-MCNC: 2.3 G/DL (CALC) (ref 1.9–3.7)
GLUCOSE SERPL-MCNC: 87 MG/DL (ref 65–99)
HCT VFR BLD AUTO: 41.6 % (ref 35–45)
HDLC SERPL-MCNC: 50 MG/DL
HGB BLD-MCNC: 13.9 G/DL (ref 11.7–15.5)
LDLC SERPL CALC-MCNC: 81 MG/DL (CALC)
LYMPHOCYTES # BLD AUTO: 1876 CELLS/UL (ref 850–3900)
LYMPHOCYTES NFR BLD AUTO: 34.1 %
MCH RBC QN AUTO: 30.3 PG (ref 27–33)
MCHC RBC AUTO-ENTMCNC: 33.4 G/DL (ref 32–36)
MCV RBC AUTO: 90.8 FL (ref 80–100)
MONOCYTES # BLD AUTO: 633 CELLS/UL (ref 200–950)
MONOCYTES NFR BLD AUTO: 11.5 %
NEUTROPHILS # BLD AUTO: 2811 CELLS/UL (ref 1500–7800)
NEUTROPHILS NFR BLD AUTO: 51.1 %
NONHDLC SERPL-MCNC: 98 MG/DL (CALC)
PLATELET # BLD AUTO: 175 THOUSAND/UL (ref 140–400)
PMV BLD REES-ECKER: 11.5 FL (ref 7.5–12.5)
POTASSIUM SERPL-SCNC: 3.8 MMOL/L (ref 3.5–5.3)
PROT SERPL-MCNC: 6.6 G/DL (ref 6.1–8.1)
RBC # BLD AUTO: 4.58 MILLION/UL (ref 3.8–5.1)
RHEUMATOID FACT SERPL-ACNC: <14 IU/ML
SODIUM SERPL-SCNC: 142 MMOL/L (ref 135–146)
TRIGL SERPL-MCNC: 90 MG/DL
TSH SERPL-ACNC: 2.05 MIU/L
WBC # BLD AUTO: 5.5 THOUSAND/UL (ref 3.8–10.8)

## 2023-02-13 ENCOUNTER — HOSPITAL ENCOUNTER (OUTPATIENT)
Dept: RADIOLOGY | Facility: HOSPITAL | Age: 38
Discharge: HOME | End: 2023-02-13
Attending: NURSE PRACTITIONER
Payer: COMMERCIAL

## 2023-02-13 ENCOUNTER — OFFICE VISIT (OUTPATIENT)
Dept: FAMILY MEDICINE | Facility: CLINIC | Age: 38
End: 2023-02-13
Payer: COMMERCIAL

## 2023-02-13 VITALS
SYSTOLIC BLOOD PRESSURE: 100 MMHG | DIASTOLIC BLOOD PRESSURE: 60 MMHG | HEIGHT: 64 IN | HEART RATE: 71 BPM | BODY MASS INDEX: 25.44 KG/M2 | WEIGHT: 149 LBS | OXYGEN SATURATION: 99 %

## 2023-02-13 DIAGNOSIS — G89.29 CHRONIC LEFT-SIDED THORACIC BACK PAIN: ICD-10-CM

## 2023-02-13 DIAGNOSIS — F90.0 ATTENTION DEFICIT HYPERACTIVITY DISORDER (ADHD), PREDOMINANTLY INATTENTIVE TYPE: Primary | ICD-10-CM

## 2023-02-13 DIAGNOSIS — E55.9 VITAMIN D DEFICIENCY: ICD-10-CM

## 2023-02-13 DIAGNOSIS — G56.03 BILATERAL CARPAL TUNNEL SYNDROME: ICD-10-CM

## 2023-02-13 DIAGNOSIS — M54.6 CHRONIC LEFT-SIDED THORACIC BACK PAIN: ICD-10-CM

## 2023-02-13 PROCEDURE — 1159F PR MEDICATION LIST DOCUMENTED IN MEDICAL RECORD: ICD-10-PCS | Mod: CPTII,S$GLB,, | Performed by: NURSE PRACTITIONER

## 2023-02-13 PROCEDURE — 72050 X-RAY EXAM NECK SPINE 4/5VWS: CPT | Mod: TC,PO

## 2023-02-13 PROCEDURE — 1160F PR REVIEW ALL MEDS BY PRESCRIBER/CLIN PHARMACIST DOCUMENTED: ICD-10-PCS | Mod: CPTII,S$GLB,, | Performed by: NURSE PRACTITIONER

## 2023-02-13 PROCEDURE — 3074F PR MOST RECENT SYSTOLIC BLOOD PRESSURE < 130 MM HG: ICD-10-PCS | Mod: CPTII,S$GLB,, | Performed by: NURSE PRACTITIONER

## 2023-02-13 PROCEDURE — 3074F SYST BP LT 130 MM HG: CPT | Mod: CPTII,S$GLB,, | Performed by: NURSE PRACTITIONER

## 2023-02-13 PROCEDURE — 72070 X-RAY EXAM THORAC SPINE 2VWS: CPT | Mod: TC,PO

## 2023-02-13 PROCEDURE — 3078F PR MOST RECENT DIASTOLIC BLOOD PRESSURE < 80 MM HG: ICD-10-PCS | Mod: CPTII,S$GLB,, | Performed by: NURSE PRACTITIONER

## 2023-02-13 PROCEDURE — 1159F MED LIST DOCD IN RCRD: CPT | Mod: CPTII,S$GLB,, | Performed by: NURSE PRACTITIONER

## 2023-02-13 PROCEDURE — 99214 PR OFFICE/OUTPT VISIT, EST, LEVL IV, 30-39 MIN: ICD-10-PCS | Mod: S$GLB,,, | Performed by: NURSE PRACTITIONER

## 2023-02-13 PROCEDURE — 3008F BODY MASS INDEX DOCD: CPT | Mod: CPTII,S$GLB,, | Performed by: NURSE PRACTITIONER

## 2023-02-13 PROCEDURE — 1160F RVW MEDS BY RX/DR IN RCRD: CPT | Mod: CPTII,S$GLB,, | Performed by: NURSE PRACTITIONER

## 2023-02-13 PROCEDURE — 3078F DIAST BP <80 MM HG: CPT | Mod: CPTII,S$GLB,, | Performed by: NURSE PRACTITIONER

## 2023-02-13 PROCEDURE — 3008F PR BODY MASS INDEX (BMI) DOCUMENTED: ICD-10-PCS | Mod: CPTII,S$GLB,, | Performed by: NURSE PRACTITIONER

## 2023-02-13 PROCEDURE — 99214 OFFICE O/P EST MOD 30 MIN: CPT | Mod: S$GLB,,, | Performed by: NURSE PRACTITIONER

## 2023-02-13 RX ORDER — ERGOCALCIFEROL 1.25 MG/1
50000 CAPSULE ORAL
Qty: 8 CAPSULE | Refills: 0 | Status: SHIPPED | OUTPATIENT
Start: 2023-02-13 | End: 2023-11-13

## 2023-02-13 RX ORDER — LISDEXAMFETAMINE DIMESYLATE 40 MG/1
40 CAPSULE ORAL EVERY MORNING
Qty: 30 CAPSULE | Refills: 0 | Status: SHIPPED | OUTPATIENT
Start: 2023-02-17 | End: 2023-03-06

## 2023-02-13 RX ORDER — LISDEXAMFETAMINE DIMESYLATE 40 MG/1
40 CAPSULE ORAL EVERY MORNING
Qty: 30 CAPSULE | Refills: 0 | Status: SHIPPED | OUTPATIENT
Start: 2023-04-15 | End: 2023-03-06

## 2023-02-13 RX ORDER — LISDEXAMFETAMINE DIMESYLATE 40 MG/1
40 CAPSULE ORAL EVERY MORNING
Qty: 30 CAPSULE | Refills: 0 | Status: SHIPPED | OUTPATIENT
Start: 2023-03-17 | End: 2023-03-06

## 2023-02-13 NOTE — PATIENT INSTRUCTIONS
Start prescription strength vitamin D [id number] one capsule weekly for 8 weeks then begin OTC vitamin D3 2000units daily

## 2023-02-13 NOTE — PROGRESS NOTES
SUBJECTIVE:    Patient ID: Pepper Fitzpatrick is a 37 y.o. female.    Chief Complaint: ADHD (No bottles//Pt is here for a check up and medication refills//decline flu and pna vacccine//KAMILA )    Pt here for regular f/u- ADHD    Seen as new pt at last visit. At last visit switched to vyvanse from Adderall due to medication shortage.  Patient reports she is doing well on Vyvanse.  At last visit had complained of some neck/back pain and bilateral hand numbness.  Previously diagnosed with carpal tunnel though had requested cervical MRI.  Cervical and thoracic x-rays ordered.  Rheumatology labs were also ordered due to her mother and sister with lupus and her pain complaints.  Patient reports she has actually been feeling pretty good recently    Hx of opioid dependence and has been on suboxone treatment for the past 8 years- Manuela Vyas NP prescribes suboxone        Office Visit on 01/12/2023   Component Date Value Ref Range Status    WBC 02/06/2023 5.5  3.8 - 10.8 Thousand/uL Final    RBC 02/06/2023 4.58  3.80 - 5.10 Million/uL Final    Hemoglobin 02/06/2023 13.9  11.7 - 15.5 g/dL Final    Hematocrit 02/06/2023 41.6  35.0 - 45.0 % Final    MCV 02/06/2023 90.8  80.0 - 100.0 fL Final    MCH 02/06/2023 30.3  27.0 - 33.0 pg Final    MCHC 02/06/2023 33.4  32.0 - 36.0 g/dL Final    RDW 02/06/2023 12.2  11.0 - 15.0 % Final    Platelets 02/06/2023 175  140 - 400 Thousand/uL Final    MPV 02/06/2023 11.5  7.5 - 12.5 fL Final    Neutrophils, Abs 02/06/2023 2,811  1,500 - 7,800 cells/uL Final    Lymph # 02/06/2023 1,876  850 - 3,900 cells/uL Final    Mono # 02/06/2023 633  200 - 950 cells/uL Final    Eos # 02/06/2023 132  15 - 500 cells/uL Final    Baso # 02/06/2023 50  0 - 200 cells/uL Final    Neutrophils Relative 02/06/2023 51.1  % Final    Lymph % 02/06/2023 34.1  % Final    Mono % 02/06/2023 11.5  % Final    Eosinophil % 02/06/2023 2.4  % Final    Basophil % 02/06/2023 0.9  % Final    Glucose 02/06/2023 87  65 - 99  mg/dL Final    BUN 02/06/2023 18  7 - 25 mg/dL Final    Creatinine 02/06/2023 0.72  0.50 - 0.97 mg/dL Final    eGFR 02/06/2023 109  > OR = 60 mL/min/1.73m2 Final    BUN/Creatinine Ratio 02/06/2023 NOT APPLICABLE  6 - 22 (calc) Final    Sodium 02/06/2023 142  135 - 146 mmol/L Final    Potassium 02/06/2023 3.8  3.5 - 5.3 mmol/L Final    Chloride 02/06/2023 105  98 - 110 mmol/L Final    CO2 02/06/2023 31  20 - 32 mmol/L Final    Calcium 02/06/2023 9.2  8.6 - 10.2 mg/dL Final    Total Protein 02/06/2023 6.6  6.1 - 8.1 g/dL Final    Albumin 02/06/2023 4.3  3.6 - 5.1 g/dL Final    Globulin, Total 02/06/2023 2.3  1.9 - 3.7 g/dL (calc) Final    Albumin/Globulin Ratio 02/06/2023 1.9  1.0 - 2.5 (calc) Final    Total Bilirubin 02/06/2023 0.9  0.2 - 1.2 mg/dL Final    Alkaline Phosphatase 02/06/2023 50  31 - 125 U/L Final    AST 02/06/2023 17  10 - 30 U/L Final    ALT 02/06/2023 12  6 - 29 U/L Final    Cholesterol 02/06/2023 148  <200 mg/dL Final    HDL 02/06/2023 50  > OR = 50 mg/dL Final    Triglycerides 02/06/2023 90  <150 mg/dL Final    LDL Cholesterol 02/06/2023 81  mg/dL (calc) Final    HDL/Cholesterol Ratio 02/06/2023 3.0  <5.0 (calc) Final    Non HDL Chol. (LDL+VLDL) 02/06/2023 98  <130 mg/dL (calc) Final    TSH w/reflex to FT4 02/06/2023 2.05  mIU/L Final    Vitamin D, 25-OH, Total 02/06/2023 18 (L)  30 - 100 ng/mL Final    SARAH 02/06/2023 NEGATIVE  NEGATIVE Final    Cyclic Citrullinated Peptide (CCP)* 02/06/2023 <16  UNITS Final    Rheumatoid Factor 02/06/2023 <14  <14 IU/mL Final    Vitamin B-12 02/06/2023 387  200 - 1,100 pg/mL Final    Hepatitis C Ab 02/06/2023 NON-REACTIVE  NON-REACTIVE Final    Signal/Cutoff 02/06/2023 0.05  <1.00 Final    Sed Rate 02/06/2023 2  < OR = 20 mm/h Final   Office Visit on 10/21/2022   Component Date Value Ref Range Status    Rapid Influenza A Ag 10/21/2022 Negative  Negative Final    Rapid Influenza B Ag 10/21/2022 Negative  Negative Final     Acceptable 10/21/2022 Yes    Final    SARS Coronavirus 2 Antigen 10/21/2022 Negative  Negative Final     Acceptable 10/21/2022 Yes   Final    Rapid Strep A Screen 10/21/2022 Negative  Negative Final     Acceptable 10/21/2022 Yes   Final       Past Medical History:   Diagnosis Date    Abnormal Pap smear     Acute nonintractable headache     Arthralgia     Breast disorder     Carpal tunnel syndrome, bilateral     Chronic constipation     Renal calculi 3/25/2013     Past Surgical History:   Procedure Laterality Date    ABDOMINAL SURGERY      breast augmentation  2006    w/breast lift    BREAST SURGERY  2002    Mastitis    COSMETIC SURGERY      HERNIA REPAIR  2005    UMBILICAL HERNIA REPAIR       Family History   Problem Relation Age of Onset    Lupus Mother     Lupus Sister     Heart disease Maternal Grandmother     Diabetes Maternal Grandmother        The 10-year CVD risk score (Margarita, et al., 2008) is: 1.6%    Values used to calculate the score:      Age: 37 years      Sex: Female      Diabetic: No      Tobacco smoker: Yes      Systolic Blood Pressure: 100 mmHg      Is BP treated: No      HDL Cholesterol: 50 mg/dL      Total Cholesterol: 148 mg/dL     Marital Status:   Alcohol History:  reports current alcohol use.  Tobacco History:  reports that she has been smoking vaping with nicotine. She has never used smokeless tobacco.  Drug History:  reports no history of drug use.    Health Maintenance Topics with due status: Not Due       Topic Last Completion Date    TETANUS VACCINE 04/08/2015     Immunization History   Administered Date(s) Administered    Tdap 04/08/2015       Review of patient's allergies indicates:   Allergen Reactions    Penicillins Blisters and Swelling    Diflucan [fluconazole] Swelling       Current Outpatient Medications:     buprenorphine-naloxone (SUBOXONE) 8-2 mg Film, DIS 1 FILM UNT BID PRN, Disp: , Rfl:     ergocalciferol (ERGOCALCIFEROL) 50,000 unit Cap, Take 1 capsule (50,000  "Units total) by mouth every 7 days. X 8 weeks then begin OTC vitamin D3 2000units daily, Disp: 8 capsule, Rfl: 0    ibuprofen (ADVIL,MOTRIN) 800 MG tablet, Take 1 tablet (800 mg total) by mouth 2 (two) times daily as needed for Pain., Disp: 20 tablet, Rfl: 0    [START ON 4/15/2023] lisdexamfetamine (VYVANSE) 40 MG Cap, Take 1 capsule (40 mg total) by mouth every morning., Disp: 30 capsule, Rfl: 0    [START ON 3/17/2023] lisdexamfetamine (VYVANSE) 40 MG Cap, Take 1 capsule (40 mg total) by mouth every morning., Disp: 30 capsule, Rfl: 0    [START ON 2/17/2023] lisdexamfetamine (VYVANSE) 40 MG Cap, Take 1 capsule (40 mg total) by mouth every morning., Disp: 30 capsule, Rfl: 0    Review of Systems   Constitutional:  Negative for appetite change, chills, fever and unexpected weight change.   HENT:  Negative for sore throat and trouble swallowing.    Respiratory:  Negative for cough, shortness of breath and wheezing.    Cardiovascular:  Negative for chest pain, palpitations and leg swelling.   Gastrointestinal:  Positive for constipation (mild constipation). Negative for abdominal pain, anal bleeding, blood in stool, diarrhea, nausea and vomiting.   Genitourinary:  Negative for dysuria, frequency, hematuria and menstrual problem.   Musculoskeletal:  Positive for back pain (left mid back pain occasionally, not bothering her recently). Negative for gait problem.   Skin:  Negative for rash.   Neurological:  Positive for numbness (c/os of numbness and burning to bilat hands at night). Negative for dizziness, syncope, weakness and headaches.   Psychiatric/Behavioral:  Negative for decreased concentration (stable on vyvanse) and dysphoric mood. The patient is not nervous/anxious.         Objective:      Vitals:    02/13/23 1535   BP: 100/60   Pulse: 71   SpO2: 99%   Weight: 67.6 kg (149 lb)   Height: 5' 3.5" (1.613 m)     Physical Exam  Vitals reviewed.   Constitutional:       General: She is not in acute distress.     " Appearance: Normal appearance. She is well-developed.   HENT:      Head: Normocephalic and atraumatic.      Right Ear: Tympanic membrane and ear canal normal.      Left Ear: Tympanic membrane and ear canal normal.   Neck:      Vascular: No carotid bruit.   Cardiovascular:      Rate and Rhythm: Normal rate and regular rhythm.      Heart sounds: No murmur heard.  Pulmonary:      Effort: Pulmonary effort is normal. No respiratory distress.      Breath sounds: Normal breath sounds. No wheezing or rales.   Abdominal:      General: There is no distension.      Palpations: Abdomen is soft.      Tenderness: There is no abdominal tenderness.   Musculoskeletal:      Cervical back: Neck supple.      Right lower leg: No edema.      Left lower leg: No edema.   Lymphadenopathy:      Cervical: No cervical adenopathy.   Skin:     General: Skin is warm and dry.      Findings: No rash.   Neurological:      General: No focal deficit present.      Mental Status: She is alert and oriented to person, place, and time.      Motor: Motor function is intact.      Gait: Gait normal.   Psychiatric:         Mood and Affect: Mood normal.         Assessment:       1. Attention deficit hyperactivity disorder (ADHD), predominantly inattentive type    2. Vitamin D deficiency           Plan:       1. Attention deficit hyperactivity disorder (ADHD), predominantly inattentive type  -stable on Vyvanse  -     lisdexamfetamine (VYVANSE) 40 MG Cap; Take 1 capsule (40 mg total) by mouth every morning.  Dispense: 30 capsule; Refill: 0  -     lisdexamfetamine (VYVANSE) 40 MG Cap; Take 1 capsule (40 mg total) by mouth every morning.  Dispense: 30 capsule; Refill: 0  -     lisdexamfetamine (VYVANSE) 40 MG Cap; Take 1 capsule (40 mg total) by mouth every morning.  Dispense: 30 capsule; Refill: 0    2. Vitamin D deficiency  -reviewed recent labs with patient, all WNL except for low vitamin-D, will add supplement  -     ergocalciferol (ERGOCALCIFEROL) 50,000 unit  Cap; Take 1 capsule (50,000 Units total) by mouth every 7 days. X 8 weeks then begin OTC vitamin D3 2000units daily  Dispense: 8 capsule; Refill: 0    Reviewed with patient cervical and thoracic x-rays performed earlier today.  Reviewed the findings on cervical x-ray mentioning widened sella however patient without symptoms with normal TSH and I do not see a concern to require pituitary imaging at this time, pt agrees    Follow up in about 3 months (around 5/13/2023) for ADHD.          Counseled on age and gender appropriate medical preventative services, including cancer screenings, immunizations, overall nutritional health, need for a consistent exercise regimen and an overall push towards maintaining a vigorous and active lifestyle.      2/13/2023 Gudelia Willard NP

## 2023-03-06 ENCOUNTER — PATIENT MESSAGE (OUTPATIENT)
Dept: FAMILY MEDICINE | Facility: CLINIC | Age: 38
End: 2023-03-06

## 2023-03-06 RX ORDER — LISDEXAMFETAMINE DIMESYLATE 50 MG/1
50 CAPSULE ORAL EVERY MORNING
Qty: 30 CAPSULE | Refills: 0 | Status: SHIPPED | OUTPATIENT
Start: 2023-03-17 | End: 2023-05-16 | Stop reason: SDUPTHER

## 2023-05-16 ENCOUNTER — OFFICE VISIT (OUTPATIENT)
Dept: FAMILY MEDICINE | Facility: CLINIC | Age: 38
End: 2023-05-16
Payer: COMMERCIAL

## 2023-05-16 VITALS
HEIGHT: 64 IN | OXYGEN SATURATION: 100 % | WEIGHT: 151 LBS | BODY MASS INDEX: 25.78 KG/M2 | SYSTOLIC BLOOD PRESSURE: 108 MMHG | DIASTOLIC BLOOD PRESSURE: 60 MMHG | HEART RATE: 70 BPM

## 2023-05-16 DIAGNOSIS — Z79.899 ENCOUNTER FOR LONG-TERM (CURRENT) USE OF OTHER MEDICATIONS: ICD-10-CM

## 2023-05-16 DIAGNOSIS — F90.0 ATTENTION DEFICIT HYPERACTIVITY DISORDER (ADHD), PREDOMINANTLY INATTENTIVE TYPE: Primary | ICD-10-CM

## 2023-05-16 DIAGNOSIS — Z51.81 ENCOUNTER FOR THERAPEUTIC DRUG MONITORING: ICD-10-CM

## 2023-05-16 DIAGNOSIS — R20.2 PARESTHESIA OF ARM: ICD-10-CM

## 2023-05-16 PROCEDURE — 1159F MED LIST DOCD IN RCRD: CPT | Mod: CPTII,S$GLB,, | Performed by: NURSE PRACTITIONER

## 2023-05-16 PROCEDURE — 1159F PR MEDICATION LIST DOCUMENTED IN MEDICAL RECORD: ICD-10-PCS | Mod: CPTII,S$GLB,, | Performed by: NURSE PRACTITIONER

## 2023-05-16 PROCEDURE — 1160F PR REVIEW ALL MEDS BY PRESCRIBER/CLIN PHARMACIST DOCUMENTED: ICD-10-PCS | Mod: CPTII,S$GLB,, | Performed by: NURSE PRACTITIONER

## 2023-05-16 PROCEDURE — 3074F PR MOST RECENT SYSTOLIC BLOOD PRESSURE < 130 MM HG: ICD-10-PCS | Mod: CPTII,S$GLB,, | Performed by: NURSE PRACTITIONER

## 2023-05-16 PROCEDURE — 99213 OFFICE O/P EST LOW 20 MIN: CPT | Mod: S$GLB,,, | Performed by: NURSE PRACTITIONER

## 2023-05-16 PROCEDURE — 99213 PR OFFICE/OUTPT VISIT, EST, LEVL III, 20-29 MIN: ICD-10-PCS | Mod: S$GLB,,, | Performed by: NURSE PRACTITIONER

## 2023-05-16 PROCEDURE — 3078F PR MOST RECENT DIASTOLIC BLOOD PRESSURE < 80 MM HG: ICD-10-PCS | Mod: CPTII,S$GLB,, | Performed by: NURSE PRACTITIONER

## 2023-05-16 PROCEDURE — 3008F PR BODY MASS INDEX (BMI) DOCUMENTED: ICD-10-PCS | Mod: CPTII,S$GLB,, | Performed by: NURSE PRACTITIONER

## 2023-05-16 PROCEDURE — 1160F RVW MEDS BY RX/DR IN RCRD: CPT | Mod: CPTII,S$GLB,, | Performed by: NURSE PRACTITIONER

## 2023-05-16 PROCEDURE — 3008F BODY MASS INDEX DOCD: CPT | Mod: CPTII,S$GLB,, | Performed by: NURSE PRACTITIONER

## 2023-05-16 PROCEDURE — 3078F DIAST BP <80 MM HG: CPT | Mod: CPTII,S$GLB,, | Performed by: NURSE PRACTITIONER

## 2023-05-16 PROCEDURE — 3074F SYST BP LT 130 MM HG: CPT | Mod: CPTII,S$GLB,, | Performed by: NURSE PRACTITIONER

## 2023-05-16 RX ORDER — LISDEXAMFETAMINE DIMESYLATE 50 MG/1
50 CAPSULE ORAL EVERY MORNING
Qty: 30 CAPSULE | Refills: 0 | Status: SHIPPED | OUTPATIENT
Start: 2023-05-16 | End: 2023-07-05

## 2023-05-16 RX ORDER — LISDEXAMFETAMINE DIMESYLATE 50 MG/1
50 CAPSULE ORAL EVERY MORNING
Qty: 30 CAPSULE | Refills: 0 | Status: SHIPPED | OUTPATIENT
Start: 2023-07-15 | End: 2023-07-05

## 2023-05-16 RX ORDER — LISDEXAMFETAMINE DIMESYLATE 50 MG/1
50 CAPSULE ORAL EVERY MORNING
Qty: 30 CAPSULE | Refills: 0 | Status: SHIPPED | OUTPATIENT
Start: 2023-06-15 | End: 2023-07-05

## 2023-05-16 NOTE — PATIENT INSTRUCTIONS
James Branham MD- neurology- call for appointment  [address] [address]  [place] [address]  Phone: [phone]

## 2023-05-16 NOTE — PROGRESS NOTES
SUBJECTIVE:    Patient ID: Pepper Fitzpatrick is a 37 y.o. female.    Chief Complaint: ADHD (No bottles//Pt is here for a check up and medication refills//decline pna vaccine//KAMILA )    Pt here for regular f/u- ADHD    Pt reports overall doing well. Reports has made decision to close her [employer] shop and just rent a chair which has really lowered her stress.    Doing well with Vyvanse    Pt reports has continued with c/o of bilat arm numbness- reports at times numbness will be from shoulders down but sometimes just involves her hands. Has previously been told she had CTS but this numbness seems to be more than CTS. Reports at times arms/hands will burn.    Hx of opioid dependence and has been on suboxone treatment for the past 8 years- Manuela Vyas NP prescribes suboxone        Office Visit on 01/12/2023   Component Date Value Ref Range Status    WBC 02/06/2023 5.5  3.8 - 10.8 Thousand/uL Final    RBC 02/06/2023 4.58  3.80 - 5.10 Million/uL Final    Hemoglobin 02/06/2023 13.9  11.7 - 15.5 g/dL Final    Hematocrit 02/06/2023 41.6  35.0 - 45.0 % Final    MCV 02/06/2023 90.8  80.0 - 100.0 fL Final    MCH 02/06/2023 30.3  27.0 - 33.0 pg Final    MCHC 02/06/2023 33.4  32.0 - 36.0 g/dL Final    RDW 02/06/2023 12.2  11.0 - 15.0 % Final    Platelets 02/06/2023 175  140 - 400 Thousand/uL Final    MPV 02/06/2023 11.5  7.5 - 12.5 fL Final    Neutrophils, Abs 02/06/2023 2,811  1,500 - 7,800 cells/uL Final    Lymph # 02/06/2023 1,876  850 - 3,900 cells/uL Final    Mono # 02/06/2023 633  200 - 950 cells/uL Final    Eos # 02/06/2023 132  15 - 500 cells/uL Final    Baso # 02/06/2023 50  0 - 200 cells/uL Final    Neutrophils Relative 02/06/2023 51.1  % Final    Lymph % 02/06/2023 34.1  % Final    Mono % 02/06/2023 11.5  % Final    Eosinophil % 02/06/2023 2.4  % Final    Basophil % 02/06/2023 0.9  % Final    Glucose 02/06/2023 87  65 - 99 mg/dL Final    BUN 02/06/2023 18  7 - 25 mg/dL Final    Creatinine 02/06/2023 0.72   0.50 - 0.97 mg/dL Final    eGFR 02/06/2023 109  > OR = 60 mL/min/1.73m2 Final    BUN/Creatinine Ratio 02/06/2023 NOT APPLICABLE  6 - 22 (calc) Final    Sodium 02/06/2023 142  135 - 146 mmol/L Final    Potassium 02/06/2023 3.8  3.5 - 5.3 mmol/L Final    Chloride 02/06/2023 105  98 - 110 mmol/L Final    CO2 02/06/2023 31  20 - 32 mmol/L Final    Calcium 02/06/2023 9.2  8.6 - 10.2 mg/dL Final    Total Protein 02/06/2023 6.6  6.1 - 8.1 g/dL Final    Albumin 02/06/2023 4.3  3.6 - 5.1 g/dL Final    Globulin, Total 02/06/2023 2.3  1.9 - 3.7 g/dL (calc) Final    Albumin/Globulin Ratio 02/06/2023 1.9  1.0 - 2.5 (calc) Final    Total Bilirubin 02/06/2023 0.9  0.2 - 1.2 mg/dL Final    Alkaline Phosphatase 02/06/2023 50  31 - 125 U/L Final    AST 02/06/2023 17  10 - 30 U/L Final    ALT 02/06/2023 12  6 - 29 U/L Final    Cholesterol 02/06/2023 148  <200 mg/dL Final    HDL 02/06/2023 50  > OR = 50 mg/dL Final    Triglycerides 02/06/2023 90  <150 mg/dL Final    LDL Cholesterol 02/06/2023 81  mg/dL (calc) Final    HDL/Cholesterol Ratio 02/06/2023 3.0  <5.0 (calc) Final    Non HDL Chol. (LDL+VLDL) 02/06/2023 98  <130 mg/dL (calc) Final    TSH w/reflex to FT4 02/06/2023 2.05  mIU/L Final    Vitamin D, 25-OH, Total 02/06/2023 18 (L)  30 - 100 ng/mL Final    SARAH 02/06/2023 NEGATIVE  NEGATIVE Final    Cyclic Citrullinated Peptide (CCP)* 02/06/2023 <16  UNITS Final    Rheumatoid Factor 02/06/2023 <14  <14 IU/mL Final    Vitamin B-12 02/06/2023 387  200 - 1,100 pg/mL Final    Hepatitis C Ab 02/06/2023 NON-REACTIVE  NON-REACTIVE Final    Signal/Cutoff 02/06/2023 0.05  <1.00 Final    Sed Rate 02/06/2023 2  < OR = 20 mm/h Final       Past Medical History:   Diagnosis Date    Abnormal Pap smear     Acute nonintractable headache     Arthralgia     Breast disorder     Carpal tunnel syndrome, bilateral     Chronic constipation     Renal calculi 3/25/2013     Past Surgical History:   Procedure Laterality Date    ABDOMINAL SURGERY      breast  augmentation  2006    w/breast lift    BREAST SURGERY  2002    Mastitis    COSMETIC SURGERY      HERNIA REPAIR  2005    UMBILICAL HERNIA REPAIR       Family History   Problem Relation Age of Onset    Lupus Mother     Lupus Sister     Heart disease Maternal Grandmother     Diabetes Maternal Grandmother        The 10-year CVD risk score (Margarita et al., 2008) is: 2%    Values used to calculate the score:      Age: 37 years      Sex: Female      Diabetic: No      Tobacco smoker: Yes      Systolic Blood Pressure: 108 mmHg      Is BP treated: No      HDL Cholesterol: 50 mg/dL      Total Cholesterol: 148 mg/dL     Marital Status:   Alcohol History:  reports current alcohol use.  Tobacco History:  reports that she has been smoking vaping with nicotine. She has been exposed to tobacco smoke. She has never used smokeless tobacco.  Drug History:  reports no history of drug use.    Health Maintenance Topics with due status: Not Due       Topic Last Completion Date    TETANUS VACCINE 04/08/2015    Hemoglobin A1c (Diabetic Prevention Screening) 06/24/2022    Cervical Cancer Screening 09/06/2022    Influenza Vaccine Not Due     Immunization History   Administered Date(s) Administered    Tdap 04/08/2015       Review of patient's allergies indicates:   Allergen Reactions    Penicillins Blisters and Swelling    Diflucan [fluconazole] Swelling       Current Outpatient Medications:     buprenorphine-naloxone (SUBOXONE) 8-2 mg Film, DIS 1 FILM UNT BID PRN, Disp: , Rfl:     ergocalciferol (ERGOCALCIFEROL) 50,000 unit Cap, Take 1 capsule (50,000 Units total) by mouth every 7 days. X 8 weeks then begin OTC vitamin D3 2000units daily, Disp: 8 capsule, Rfl: 0    ibuprofen (ADVIL,MOTRIN) 800 MG tablet, Take 1 tablet (800 mg total) by mouth 2 (two) times daily as needed for Pain., Disp: 20 tablet, Rfl: 0    lisdexamfetamine (VYVANSE) 50 MG capsule, Take 1 capsule (50 mg total) by mouth every morning., Disp: 30 capsule, Rfl: 0     "[START ON 6/15/2023] lisdexamfetamine (VYVANSE) 50 MG capsule, Take 1 capsule (50 mg total) by mouth every morning., Disp: 30 capsule, Rfl: 0    [START ON 7/15/2023] lisdexamfetamine (VYVANSE) 50 MG capsule, Take 1 capsule (50 mg total) by mouth every morning., Disp: 30 capsule, Rfl: 0    Review of Systems   Constitutional:  Negative for appetite change, chills, fever and unexpected weight change.   Respiratory:  Negative for cough, shortness of breath and wheezing.    Cardiovascular:  Negative for chest pain, palpitations and leg swelling.   Gastrointestinal:  Positive for constipation. Negative for abdominal pain, anal bleeding, blood in stool, diarrhea, nausea and vomiting.   Genitourinary:  Negative for dysuria, frequency, hematuria and menstrual problem.   Musculoskeletal:  Positive for back pain (left mid back pain occasionally, not bothering her recently). Negative for gait problem.   Skin:  Negative for rash.   Neurological:  Positive for numbness (numbness bilat hands and at times involves her entire arm). Negative for dizziness, syncope, weakness and headaches.   Psychiatric/Behavioral:  Negative for decreased concentration (stable on vyvanse) and dysphoric mood. The patient is not nervous/anxious.         Objective:      Vitals:    05/16/23 1015   BP: 108/60   Pulse: 70   SpO2: 100%   Weight: 68.5 kg (151 lb)   Height: 5' 3.5" (1.613 m)     Physical Exam  Vitals reviewed.   Constitutional:       General: She is not in acute distress.     Appearance: Normal appearance. She is well-developed.   HENT:      Head: Normocephalic and atraumatic.   Neck:      Vascular: No carotid bruit.   Cardiovascular:      Rate and Rhythm: Normal rate and regular rhythm.      Heart sounds: No murmur heard.  Pulmonary:      Effort: Pulmonary effort is normal. No respiratory distress.      Breath sounds: Normal breath sounds. No wheezing or rales.   Abdominal:      General: There is no distension.      Palpations: Abdomen is " soft.      Tenderness: There is no abdominal tenderness.   Musculoskeletal:      Cervical back: Neck supple.      Right lower leg: No edema.      Left lower leg: No edema.      Comments: Right thenar eminence atrophy   Lymphadenopathy:      Cervical: No cervical adenopathy.   Skin:     General: Skin is warm and dry.      Findings: No rash.   Neurological:      General: No focal deficit present.      Mental Status: She is alert and oriented to person, place, and time.      Motor: Motor function is intact.      Gait: Gait normal.   Psychiatric:         Mood and Affect: Mood normal.         Assessment:       1. Attention deficit hyperactivity disorder (ADHD), predominantly inattentive type    2. Paresthesia of arm    3. Encounter for therapeutic drug monitoring    4. Encounter for long-term (current) use of other medications           Plan:       1. Attention deficit hyperactivity disorder (ADHD), predominantly inattentive type  -stable on med  -     DRUG MONITOR, PANEL 4, W/CONF, URINE; Future; Expected date: 05/16/2023  -     lisdexamfetamine (VYVANSE) 50 MG capsule; Take 1 capsule (50 mg total) by mouth every morning.  Dispense: 30 capsule; Refill: 0  -     lisdexamfetamine (VYVANSE) 50 MG capsule; Take 1 capsule (50 mg total) by mouth every morning.  Dispense: 30 capsule; Refill: 0  -     lisdexamfetamine (VYVANSE) 50 MG capsule; Take 1 capsule (50 mg total) by mouth every morning.  Dispense: 30 capsule; Refill: 0    2. Paresthesia of arm  -patient advised suspect more severe carpal tunnel at least right hand due to thenar atrophy though recommend EMG/NCS for definitive diagnosis.  Will refer to Neurology  -     Ambulatory referral/consult to Neurology; Future; Expected date: 05/23/2023    3. Encounter for therapeutic drug monitoring  -     DRUG MONITOR, PANEL 4, W/CONF, URINE; Future; Expected date: 05/16/2023    4. Encounter for long-term (current) use of other medications  -     DRUG MONITOR, PANEL 4, W/CONF,  URINE; Future; Expected date: 05/16/2023      Follow up in about 6 months (around 11/16/2023) for UDS today, ADHD.          Counseled on age and gender appropriate medical preventative services, including cancer screenings, immunizations, overall nutritional health, need for a consistent exercise regimen and an overall push towards maintaining a vigorous and active lifestyle.      5/16/2023 Gudelia Willard NP

## 2023-05-19 LAB
AMPHET UR-MCNC: 1817 NG/ML
AMPHETAMINES UR QL: POSITIVE NG/ML
BARBITURATES UR QL: NEGATIVE NG/ML
BENZODIAZ UR QL: NEGATIVE NG/ML
BZE UR QL: NEGATIVE NG/ML
CODEINE UR-MCNC: NEGATIVE NG/ML
CREAT UR-MCNC: 166.9 MG/DL
DRUG SCREEN COMMENT UR-IMP: ABNORMAL
DRUG SCREEN COMMENT UR-IMP: ABNORMAL
HYDROCODONE UR-MCNC: NEGATIVE NG/ML
HYDROMORPHONE UR-MCNC: NEGATIVE NG/ML
METHADONE UR QL: NEGATIVE NG/ML
METHAMPHET UR-MCNC: NEGATIVE NG/ML
MORPHINE UR-MCNC: NEGATIVE NG/ML
NORHYDROCODONE UR CFM-MCNC: NEGATIVE NG/ML
NOTES AND COMMENTS: ABNORMAL
OPIATES UR QL: ABNORMAL NG/ML
OXIDANTS UR QL: NEGATIVE MCG/ML
OXYCODONE UR QL: NEGATIVE NG/ML
PCP UR QL: NEGATIVE NG/ML
PH UR: 6.2 [PH] (ref 4.5–9)

## 2023-07-05 DIAGNOSIS — F90.0 ATTENTION DEFICIT HYPERACTIVITY DISORDER (ADHD), PREDOMINANTLY INATTENTIVE TYPE: ICD-10-CM

## 2023-07-05 RX ORDER — LISDEXAMFETAMINE DIMESYLATE 50 MG/1
50 CAPSULE ORAL EVERY MORNING
Qty: 30 CAPSULE | Refills: 0 | Status: SHIPPED | OUTPATIENT
Start: 2023-07-05 | End: 2023-08-07 | Stop reason: SDUPTHER

## 2023-07-05 NOTE — TELEPHONE ENCOUNTER
Pt states that her vyvanse is out of stock at her pharmacy. Pt would like a prescription for her Vyvanse sent to the [facility]'s on [address].     Last office visit 05/16/2023.  Next office visit 11/13/2023.   Chart shows last dispense 06/15/2023.  UDS done on 05/16/2023.    SUBJECTIVE:    FEELING MUCH BETTER. NO CHEST OR ABDOMINAL PAIN. No SOB or cough. No pain currently. No N/V/D. OBJECTIVE:    Visit Vitals    /67    Pulse 62    Temp 98.4 °F (36.9 °C)    Resp (!) 36    Wt 65.7 kg (144 lb 13.5 oz)    SpO2 100%    BMI 20.78 kg/m2     RS: CTA bilaterally, no wheezes  CVS: RRR  GI: NT, ND, Soft  Extremities:  L AKA and dressing in situ. No pedal edema of RLE  General: NAD, follows commands     ASSESSMENT:    1. Acute metabolic encephalopathy due to pain, clinically improved   2. Infected graft and ischemic rest left leg pain s/p removal of right infected graft with jump bypass and AMPUTATION KNEE(AKA)-left leg  3. Leukocytosis and LG fever, better  4. Hyponatremia, better  5. Anemia including iron deficiency anemia, stable  6. Prolonged QTc initially, improved   7. history of ischemic CMO EF 40% on echo done 04/17. Compensated. 8. Afib. Rate controlled.     PLAN:    Continue current management  Venofer x 1  cont iv ampicillin/sulbactam till 9/19/17 per ID  Transfer patient to surgical unit [talked to vascular surgery PA]  ARU screen ordered      CMP:   Lab Results   Component Value Date/Time     (L) 08/23/2017 05:50 AM    K 3.5 08/23/2017 05:50 AM     08/23/2017 05:50 AM    CO2 28 08/23/2017 05:50 AM    AGAP 6 08/23/2017 05:50 AM    GLU 93 08/23/2017 05:50 AM    BUN 6 (L) 08/23/2017 05:50 AM    CREA 0.50 (L) 08/23/2017 05:50 AM    GFRAA >60 08/23/2017 05:50 AM    GFRNA >60 08/23/2017 05:50 AM    CA 8.7 08/23/2017 05:50 AM    MG 1.7 08/23/2017 05:50 AM    ALB 2.1 (L) 08/23/2017 05:50 AM    TP 6.9 08/23/2017 05:50 AM    GLOB 4.8 (H) 08/23/2017 05:50 AM    AGRAT 0.4 (L) 08/23/2017 05:50 AM    SGOT 54 (H) 08/23/2017 05:50 AM    ALT 23 08/23/2017 05:50 AM     CBC:   Lab Results   Component Value Date/Time    WBC 11.6 08/23/2017 05:50 AM    HGB 8.1 (L) 08/23/2017 05:50 AM    HCT 24.6 (L) 08/23/2017 05:50 AM     (H) 08/23/2017 05:50 AM

## 2023-07-05 NOTE — TELEPHONE ENCOUNTER
----- Message from Anjali Richey sent at 7/5/2023 10:07 AM CDT -----  Patient called and sated that her prescription for lisdexamfetamine is back order at her pharmacy [facility] and she would like to have it called into another pharmacy [facility] on [address]. If any questions please give her a call at [phone]

## 2023-08-07 DIAGNOSIS — F90.0 ATTENTION DEFICIT HYPERACTIVITY DISORDER (ADHD), PREDOMINANTLY INATTENTIVE TYPE: ICD-10-CM

## 2023-08-07 RX ORDER — LISDEXAMFETAMINE DIMESYLATE 50 MG/1
50 CAPSULE ORAL EVERY MORNING
Qty: 30 CAPSULE | Refills: 0 | Status: SHIPPED | OUTPATIENT
Start: 2023-09-06 | End: 2023-11-13

## 2023-08-07 RX ORDER — LISDEXAMFETAMINE DIMESYLATE 50 MG/1
50 CAPSULE ORAL EVERY MORNING
Qty: 30 CAPSULE | Refills: 0 | Status: SHIPPED | OUTPATIENT
Start: 2023-08-07 | End: 2023-11-13

## 2023-08-07 RX ORDER — LISDEXAMFETAMINE DIMESYLATE 50 MG/1
50 CAPSULE ORAL EVERY MORNING
Qty: 30 CAPSULE | Refills: 0 | Status: SHIPPED | OUTPATIENT
Start: 2023-10-06 | End: 2023-11-13

## 2023-08-07 NOTE — TELEPHONE ENCOUNTER
Pt is needing a refill on her Vyvanse. Last office visit 05/16/2023. Next office visit 11/13/2023. Chart shows last dispense 07/05/2023. UDS done on 05/16/2023

## 2023-10-23 DIAGNOSIS — F90.0 ATTENTION DEFICIT HYPERACTIVITY DISORDER (ADHD), PREDOMINANTLY INATTENTIVE TYPE: ICD-10-CM

## 2023-10-23 RX ORDER — LISDEXAMFETAMINE DIMESYLATE 50 MG/1
50 CAPSULE ORAL EVERY MORNING
Qty: 30 CAPSULE | Refills: 0 | Status: CANCELLED | OUTPATIENT
Start: 2023-10-23

## 2023-10-23 NOTE — TELEPHONE ENCOUNTER
Pt is needing a refill on her Vyvanse. Last office visit 05/16/2023. Next office visit 11/13/2023.     Spoke with pt in regards to recent medication refill request. Verbalized to the pt that she is not due for a fill yet. Looks like that there was a prescription at her pharmacy still. Recommended that she call her pharmacy for this prescription. Pt acknowledged  understanding.

## 2023-11-13 ENCOUNTER — OFFICE VISIT (OUTPATIENT)
Dept: FAMILY MEDICINE | Facility: CLINIC | Age: 38
End: 2023-11-13
Payer: COMMERCIAL

## 2023-11-13 VITALS
WEIGHT: 147 LBS | SYSTOLIC BLOOD PRESSURE: 118 MMHG | HEIGHT: 64 IN | DIASTOLIC BLOOD PRESSURE: 80 MMHG | BODY MASS INDEX: 25.1 KG/M2 | OXYGEN SATURATION: 99 % | HEART RATE: 60 BPM

## 2023-11-13 DIAGNOSIS — F11.21 OPIOID DEPENDENCE IN REMISSION: ICD-10-CM

## 2023-11-13 DIAGNOSIS — Z12.31 SCREENING MAMMOGRAM, ENCOUNTER FOR: ICD-10-CM

## 2023-11-13 DIAGNOSIS — Z13.1 DIABETES MELLITUS SCREENING: ICD-10-CM

## 2023-11-13 DIAGNOSIS — E55.9 VITAMIN D DEFICIENCY: ICD-10-CM

## 2023-11-13 DIAGNOSIS — F90.0 ATTENTION DEFICIT HYPERACTIVITY DISORDER (ADHD), PREDOMINANTLY INATTENTIVE TYPE: Primary | ICD-10-CM

## 2023-11-13 DIAGNOSIS — Z00.00 ANNUAL PHYSICAL EXAM: ICD-10-CM

## 2023-11-13 PROCEDURE — 3008F BODY MASS INDEX DOCD: CPT | Mod: CPTII,S$GLB,, | Performed by: NURSE PRACTITIONER

## 2023-11-13 PROCEDURE — 3008F PR BODY MASS INDEX (BMI) DOCUMENTED: ICD-10-PCS | Mod: CPTII,S$GLB,, | Performed by: NURSE PRACTITIONER

## 2023-11-13 PROCEDURE — 1159F PR MEDICATION LIST DOCUMENTED IN MEDICAL RECORD: ICD-10-PCS | Mod: CPTII,S$GLB,, | Performed by: NURSE PRACTITIONER

## 2023-11-13 PROCEDURE — 3074F SYST BP LT 130 MM HG: CPT | Mod: CPTII,S$GLB,, | Performed by: NURSE PRACTITIONER

## 2023-11-13 PROCEDURE — 99214 PR OFFICE/OUTPT VISIT, EST, LEVL IV, 30-39 MIN: ICD-10-PCS | Mod: S$GLB,,, | Performed by: NURSE PRACTITIONER

## 2023-11-13 PROCEDURE — 1160F PR REVIEW ALL MEDS BY PRESCRIBER/CLIN PHARMACIST DOCUMENTED: ICD-10-PCS | Mod: CPTII,S$GLB,, | Performed by: NURSE PRACTITIONER

## 2023-11-13 PROCEDURE — 1160F RVW MEDS BY RX/DR IN RCRD: CPT | Mod: CPTII,S$GLB,, | Performed by: NURSE PRACTITIONER

## 2023-11-13 PROCEDURE — 1159F MED LIST DOCD IN RCRD: CPT | Mod: CPTII,S$GLB,, | Performed by: NURSE PRACTITIONER

## 2023-11-13 PROCEDURE — 3074F PR MOST RECENT SYSTOLIC BLOOD PRESSURE < 130 MM HG: ICD-10-PCS | Mod: CPTII,S$GLB,, | Performed by: NURSE PRACTITIONER

## 2023-11-13 PROCEDURE — 3079F DIAST BP 80-89 MM HG: CPT | Mod: CPTII,S$GLB,, | Performed by: NURSE PRACTITIONER

## 2023-11-13 PROCEDURE — 99214 OFFICE O/P EST MOD 30 MIN: CPT | Mod: S$GLB,,, | Performed by: NURSE PRACTITIONER

## 2023-11-13 PROCEDURE — 3079F PR MOST RECENT DIASTOLIC BLOOD PRESSURE 80-89 MM HG: ICD-10-PCS | Mod: CPTII,S$GLB,, | Performed by: NURSE PRACTITIONER

## 2023-11-13 RX ORDER — LISDEXAMFETAMINE DIMESYLATE 50 MG/1
50 CAPSULE ORAL EVERY MORNING
Qty: 30 CAPSULE | Refills: 0 | Status: SHIPPED | OUTPATIENT
Start: 2024-01-21

## 2023-11-13 RX ORDER — LISDEXAMFETAMINE DIMESYLATE 50 MG/1
50 CAPSULE ORAL EVERY MORNING
Qty: 30 CAPSULE | Refills: 0 | Status: SHIPPED | OUTPATIENT
Start: 2023-11-22 | End: 2024-02-27 | Stop reason: SDUPTHER

## 2023-11-13 RX ORDER — LISDEXAMFETAMINE DIMESYLATE 50 MG/1
50 CAPSULE ORAL EVERY MORNING
Qty: 30 CAPSULE | Refills: 0 | Status: SHIPPED | OUTPATIENT
Start: 2023-12-22

## 2023-11-13 NOTE — PROGRESS NOTES
SUBJECTIVE:    Patient ID: Pepper Fitzpatrick is a 37 y.o. female.    Chief Complaint: ADHD (Bottles brought//Pt is here for a check up and medication refills//decline flu vaccine//KAMILA  )    Pt here for regular 6 month f/u- ADHD    Pt reports overall she's been doing well- stays busy with work and kids.  No complaints today    Hx of opioid dependence and has been on suboxone treatment for the past 8 years- Manuela Vyas NP prescribes suboxone    Sees Dr. Cruz, gyn for Pap          Office Visit on 05/16/2023   Component Date Value Ref Range Status    Amphetamines 05/16/2023 POSITIVE (A)  <500 ng/mL Final    Amphetamine 05/16/2023 1,817 (H)  <250 ng/mL Final    Methamphetamine 05/16/2023 NEGATIVE  <250 ng/mL Final    Amphetamines Comments 05/16/2023    Final    Barbiturates 05/16/2023 NEGATIVE  <300 ng/mL Final    Benzodiazepines 05/16/2023 NEGATIVE  <100 ng/mL Final    Cocaine Metabolites 05/16/2023 NEGATIVE  <150 ng/mL Final    Methadone 05/16/2023 NEGATIVE  <100 ng/mL Final    Opiates 05/16/2023 NEGATIVE CONFIRMED  <100 ng/mL Final    Codeine 05/16/2023 NEGATIVE  <50 ng/mL Final    Hydrocodone 05/16/2023 NEGATIVE  <50 ng/mL Final    Hydromorphone 05/16/2023 NEGATIVE  <50 ng/mL Final    Morphine 05/16/2023 NEGATIVE  <50 ng/mL Final    NORHYDROCODONE 05/16/2023 NEGATIVE  <50 ng/mL Final    Opiates Comments 05/16/2023    Final    Oxycodone 05/16/2023 NEGATIVE  <100 ng/mL Final    Phencyclidine 05/16/2023 NEGATIVE  <25 ng/mL Final    Creatinine 05/16/2023 166.9  > or = 20.0 mg/dL Final    pH 05/16/2023 6.2  4.5 - 9.0 Final    Oxidants, Urine (Tox) 05/16/2023 NEGATIVE  <200 mcg/mL Final    Notes and Comments 05/16/2023    Final       Past Medical History:   Diagnosis Date    Abnormal Pap smear     Acute nonintractable headache     Arthralgia     Breast disorder     Carpal tunnel syndrome, bilateral     Chronic constipation     Renal calculi 3/25/2013     Past Surgical History:   Procedure Laterality Date     ABDOMINAL SURGERY      breast augmentation  2006    w/breast lift    BREAST SURGERY  2002    Mastitis    COSMETIC SURGERY      HERNIA REPAIR  2005    UMBILICAL HERNIA REPAIR       Family History   Problem Relation Age of Onset    Lupus Mother     Lupus Sister     Heart disease Maternal Grandmother     Diabetes Maternal Grandmother        All of your core healthy metrics are met.      The 10-year CVD risk score (Margarita, et al., 2008) is: 2.5%    Values used to calculate the score:      Age: 37 years      Sex: Female      Diabetic: No      Tobacco smoker: Yes      Systolic Blood Pressure: 118 mmHg      Is BP treated: No      HDL Cholesterol: 50 mg/dL      Total Cholesterol: 148 mg/dL     Marital Status:   Alcohol History:  reports current alcohol use.  Tobacco History:  reports that she has been smoking vaping with nicotine. She has been exposed to tobacco smoke. She has never used smokeless tobacco.  Drug History:  reports no history of drug use.    Health Maintenance Topics with due status: Not Due       Topic Last Completion Date    TETANUS VACCINE 04/08/2015    Cervical Cancer Screening 09/06/2022    Hemoglobin A1c (Diabetic Prevention Screening) 06/05/2023     Immunization History   Administered Date(s) Administered    Tdap 04/08/2015       Review of patient's allergies indicates:   Allergen Reactions    Penicillins Blisters and Swelling    Diflucan [fluconazole] Swelling       Current Outpatient Medications:     buprenorphine-naloxone (SUBOXONE) 8-2 mg Film, DIS 1 FILM UNT BID PRN, Disp: , Rfl:     ibuprofen (ADVIL,MOTRIN) 800 MG tablet, Take 1 tablet (800 mg total) by mouth 2 (two) times daily as needed for Pain., Disp: 20 tablet, Rfl: 0    [START ON 11/22/2023] lisdexamfetamine (VYVANSE) 50 MG capsule, Take 1 capsule (50 mg total) by mouth every morning., Disp: 30 capsule, Rfl: 0    [START ON 12/22/2023] lisdexamfetamine (VYVANSE) 50 MG capsule, Take 1 capsule (50 mg total) by mouth every morning.,  "Disp: 30 capsule, Rfl: 0    [START ON 1/21/2024] lisdexamfetamine (VYVANSE) 50 MG capsule, Take 1 capsule (50 mg total) by mouth every morning., Disp: 30 capsule, Rfl: 0    Review of Systems   Constitutional:  Negative for appetite change, chills, fever and unexpected weight change.   Respiratory:  Negative for cough, shortness of breath and wheezing.    Cardiovascular:  Negative for chest pain, palpitations and leg swelling.   Gastrointestinal:  Positive for constipation. Negative for abdominal pain, anal bleeding, blood in stool, diarrhea, nausea and vomiting.   Genitourinary:  Negative for dysuria, frequency, hematuria and menstrual problem (regular cycle, UTD with pap).   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 Negative for gait problem.   Skin:  Negative for rash.   Neurological:  Positive for numbness (numbness bilat hands and at times involves her entire arm, declines referral for CTS eval). Negative for dizziness, syncope, weakness and headaches.   Psychiatric/Behavioral:  Negative for decreased concentration (stable on vyvanse) and dysphoric mood. The patient is not nervous/anxious.           Objective:      Vitals:    11/13/23 1150   BP: 118/80   Pulse: 60   SpO2: 99%   Weight: 66.7 kg (147 lb)   Height: 5' 3.5" (1.613 m)     Physical Exam  Vitals reviewed.   Constitutional:       General: She is not in acute distress.     Appearance: Normal appearance. She is well-developed.   HENT:      Head: Normocephalic and atraumatic.      Right Ear: Tympanic membrane and ear canal normal.      Left Ear: Tympanic membrane and ear canal normal.   Neck:      Vascular: No carotid bruit.   Cardiovascular:      Rate and Rhythm: Normal rate and regular rhythm.      Heart sounds: No murmur heard.  Pulmonary:      Effort: Pulmonary effort is normal. No respiratory distress.      Breath sounds: Normal breath sounds. No wheezing or rales.   Abdominal:      " General: There is no distension.      Palpations: Abdomen is soft.      Tenderness: There is no abdominal tenderness.   Musculoskeletal:      Cervical back: Neck supple.      Right lower leg: No edema.      Left lower leg: No edema.   Lymphadenopathy:      Cervical: No cervical adenopathy.   Skin:     General: Skin is warm and dry.      Findings: No rash.   Neurological:      General: No focal deficit present.      Mental Status: She is alert and oriented to person, place, and time.      Motor: Motor function is intact.      Gait: Gait normal.   Psychiatric:         Mood and Affect: Mood normal.           Assessment:       1. Attention deficit hyperactivity disorder (ADHD), predominantly inattentive type    2. Vitamin D deficiency    3. Opioid dependence in remission    4. Screening mammogram, encounter for    5. Annual physical exam    6. Diabetes mellitus screening           Plan:       1. Attention deficit hyperactivity disorder (ADHD), predominantly inattentive type  -stable on medication  -     lisdexamfetamine (VYVANSE) 50 MG capsule; Take 1 capsule (50 mg total) by mouth every morning.  Dispense: 30 capsule; Refill: 0  -     lisdexamfetamine (VYVANSE) 50 MG capsule; Take 1 capsule (50 mg total) by mouth every morning.  Dispense: 30 capsule; Refill: 0  -     lisdexamfetamine (VYVANSE) 50 MG capsule; Take 1 capsule (50 mg total) by mouth every morning.  Dispense: 30 capsule; Refill: 0    2. Vitamin D deficiency  -patient advised she should be taking over-the-counter vitamin D3 2000 units daily, recheck annual labs in February  -     Vitamin D; Future; Expected date: 11/13/2023    3. Opioid dependence in remission   -has been stable on Suboxone for over 8 years now, follow-up with Manuela Vyas NP    4. Screening mammogram, encounter for  -turning 38 later this month, baseline mammogram ordered  -     Mammo Digital Screening Bilat w/ [place]um; Future; Expected date: 11/24/2023    5. Annual physical exam  -     CBC  Auto Differential; Future; Expected date: 11/13/2023  -     Comprehensive Metabolic Panel; Future; Expected date: 11/13/2023  -     Lipid Panel; Future; Expected date: 11/13/2023  -     Urinalysis, Reflex to Urine Culture Urine, Clean Catch; Future; Expected date: 11/13/2023  -     TSH w/reflex to FT4; Future; Expected date: 11/13/2023    6. Diabetes mellitus screening  -     Hemoglobin A1C; Future; Expected date: 11/13/2023      Follow up in about 6 months (around 5/13/2024) for ADHD.          Counseled on age and gender appropriate medical preventative services, including cancer screenings, immunizations, overall nutritional health, need for a consistent exercise regimen and an overall push towards maintaining a vigorous and active lifestyle.      11/13/2023 Gudelia Willard NP

## 2024-02-09 ENCOUNTER — OFFICE VISIT (OUTPATIENT)
Dept: URGENT CARE | Facility: CLINIC | Age: 39
End: 2024-02-09
Payer: COMMERCIAL

## 2024-02-09 VITALS
HEIGHT: 63 IN | RESPIRATION RATE: 18 BRPM | SYSTOLIC BLOOD PRESSURE: 117 MMHG | BODY MASS INDEX: 24.98 KG/M2 | TEMPERATURE: 99 F | OXYGEN SATURATION: 99 % | DIASTOLIC BLOOD PRESSURE: 80 MMHG | HEART RATE: 115 BPM | WEIGHT: 141 LBS

## 2024-02-09 DIAGNOSIS — J02.9 PHARYNGITIS, UNSPECIFIED ETIOLOGY: ICD-10-CM

## 2024-02-09 DIAGNOSIS — J02.9 SORE THROAT: Primary | ICD-10-CM

## 2024-02-09 DIAGNOSIS — R51.9 NONINTRACTABLE HEADACHE, UNSPECIFIED CHRONICITY PATTERN, UNSPECIFIED HEADACHE TYPE: ICD-10-CM

## 2024-02-09 PROCEDURE — 96372 THER/PROPH/DIAG INJ SC/IM: CPT | Mod: S$GLB,,, | Performed by: NURSE PRACTITIONER

## 2024-02-09 PROCEDURE — 87811 SARS-COV-2 COVID19 W/OPTIC: CPT | Mod: QW,S$GLB,, | Performed by: NURSE PRACTITIONER

## 2024-02-09 PROCEDURE — 87804 INFLUENZA ASSAY W/OPTIC: CPT | Mod: 59,QW,, | Performed by: NURSE PRACTITIONER

## 2024-02-09 PROCEDURE — 87880 STREP A ASSAY W/OPTIC: CPT | Mod: QW,,, | Performed by: NURSE PRACTITIONER

## 2024-02-09 PROCEDURE — 99204 OFFICE O/P NEW MOD 45 MIN: CPT | Mod: 25,S$GLB,, | Performed by: NURSE PRACTITIONER

## 2024-02-09 RX ORDER — AZITHROMYCIN 500 MG/1
500 TABLET, FILM COATED ORAL DAILY
Qty: 5 TABLET | Refills: 0 | Status: SHIPPED | OUTPATIENT
Start: 2024-02-09 | End: 2024-02-14

## 2024-02-09 RX ORDER — DEXAMETHASONE SODIUM PHOSPHATE 4 MG/ML
8 INJECTION, SOLUTION INTRA-ARTICULAR; INTRALESIONAL; INTRAMUSCULAR; INTRAVENOUS; SOFT TISSUE
Status: COMPLETED | OUTPATIENT
Start: 2024-02-09 | End: 2024-02-09

## 2024-02-09 RX ADMIN — DEXAMETHASONE SODIUM PHOSPHATE 8 MG: 4 INJECTION, SOLUTION INTRA-ARTICULAR; INTRALESIONAL; INTRAMUSCULAR; INTRAVENOUS; SOFT TISSUE at 05:02

## 2024-02-09 NOTE — PROGRESS NOTES
"Subjective:      Patient ID: Pepper Fitzpatrick is a 38 y.o. female.    Vitals:  height is 5' 3" (1.6 m) and weight is 64 kg (141 lb). Her oral temperature is 98.7 °F (37.1 °C). Her blood pressure is 117/80 and her pulse is 115 (abnormal). Her respiration is 18 and oxygen saturation is 99%.     Chief Complaint: Sore Throat    Patient reports she has a history of strep throat in the symptoms she is experiencing right now feel just like she has felt in the past with strep throat    Sore Throat   This is a new problem. The current episode started today. Associated symptoms include headaches. Pertinent negatives include no congestion, coughing, diarrhea, ear discharge, ear pain or vomiting. Associated symptoms comments: Body aches. Treatments tried: aleve.       Constitution: Positive for fatigue and fever.   HENT:  Positive for sore throat (noticed white spot on tonsil). Negative for ear pain, ear discharge and congestion.    Respiratory:  Negative for cough.    Gastrointestinal:  Negative for nausea, vomiting and diarrhea.   Musculoskeletal:  Positive for muscle ache (Generalized).   Neurological:  Positive for headaches.      Objective:     Physical Exam   Constitutional: She is oriented to person, place, and time. She is cooperative.  Non-toxic appearance. She does not appear ill. No distress. awake  HENT:   Head: Normocephalic and atraumatic.   Ears:   Right Ear: Tympanic membrane, external ear and ear canal normal.   Left Ear: Tympanic membrane, external ear and ear canal normal.   Nose: No rhinorrhea or congestion.   Mouth/Throat: Mucous membranes are moist. Posterior oropharyngeal erythema present. No oropharyngeal exudate.   Eyes: Conjunctivae are normal. Right eye exhibits no discharge. Left eye exhibits no discharge.   Neck: Neck supple. No neck rigidity present.   Cardiovascular: Normal rate, regular rhythm and normal heart sounds.   Pulmonary/Chest: Effort normal and breath sounds normal. No accessory " muscle usage. No tachypnea. No respiratory distress. She has no wheezes. She has no rhonchi. She has no rales. She exhibits no tenderness.   Abdominal: Normal appearance.   Musculoskeletal:      Cervical back: She exhibits tenderness.   Lymphadenopathy:     She has cervical adenopathy.   Neurological: no focal deficit. She is alert and oriented to person, place, and time. No sensory deficit.   Skin: Skin is warm, dry, not diaphoretic and no rash. Capillary refill takes 2 to 3 seconds.   Psychiatric: Her behavior is normal. Mood normal.   Nursing note and vitals reviewed.      Assessment:     1. Sore throat    2. Pharyngitis, unspecified etiology    3. Nonintractable headache, unspecified chronicity pattern, unspecified headache type      COVID negative  Flu a/B negative  Strep A negative    Throat culture pending.  Plan:       Sore throat  -     SARS Coronavirus 2 Antigen, POCT Manual Read  -     POCT Influenza A/B Rapid Antigen  -     POCT rapid strep A    Pharyngitis, unspecified etiology  -     Upper Respiratory Culture, Routine  -     azithromycin (ZITHROMAX) 500 MG tablet; Take 1 tablet (500 mg total) by mouth once daily. for 5 days  Dispense: 5 tablet; Refill: 0  -     dexAMETHasone injection 8 mg    Nonintractable headache, unspecified chronicity pattern, unspecified headache type  -     Upper Respiratory Culture, Routine  -     azithromycin (ZITHROMAX) 500 MG tablet; Take 1 tablet (500 mg total) by mouth once daily. for 5 days  Dispense: 5 tablet; Refill: 0  -     dexAMETHasone injection 8 mg

## 2024-02-09 NOTE — PATIENT INSTRUCTIONS
Azithromycin once a day for 5 days.    Please take a probiotic while you are on antibiotics plus a few weeks. Examples of probiotics include brand names such as Align, Floraster and Culturelle, but generic versions are ok too. Kefir is a milk product that is also an excellent probiotic and can be taken as 1/4 cup three times a day with meals.      Alternate ibuprofen and Tylenol every 4 hours to manage high fevers.  Then just as needed for pain/discomfort.  Ibuprofen will be better served to treat the discomfort associated with the sore throat then Tylenol will.    Sore throat lozenges, sore throat sprays over-the-counter as directed.    Cool, soft, easy to swallow foods today until symptoms are improved.    May return to school, public events after completing 24 hours of antibiotics as he/she is no longer considered contagious.      Please take this antibiotic until complete even though symptoms improve early on  After 24 hours of antibiotics throw away his/her tooth brush and toothpaste and replace with a brand new.  Any other persons that may have been using the same toothpaste need to also have their toothbrush replaced and watch for symptoms of strep throat to emerge.  If so, please seek medical evaluation for testing and treatment.      Throat culture obtained today will be sent out to the lab for analysis.  Expect results in 3-7 days.  If you see that the results come back negative on [facility] MyChart You can stop taking antibiotics if you had not already completed a med that time.    Personal protection against illness for 1-2 weeks due to lowered immune system from steroid therapy.  Please wear a mask and eye protection around others and wash hands often

## 2024-02-15 LAB
BACTERIA SPEC RESP CULT: NORMAL
BACTERIA SPEC RESP CULT: NORMAL

## 2024-02-27 DIAGNOSIS — F90.0 ATTENTION DEFICIT HYPERACTIVITY DISORDER (ADHD), PREDOMINANTLY INATTENTIVE TYPE: ICD-10-CM

## 2024-02-27 RX ORDER — LISDEXAMFETAMINE DIMESYLATE 50 MG/1
50 CAPSULE ORAL EVERY MORNING
Qty: 30 CAPSULE | Refills: 0 | Status: SHIPPED | OUTPATIENT
Start: 2024-04-28 | End: 2024-05-14 | Stop reason: SDUPTHER

## 2024-02-27 RX ORDER — LISDEXAMFETAMINE DIMESYLATE 50 MG/1
50 CAPSULE ORAL EVERY MORNING
Qty: 30 CAPSULE | Refills: 0 | Status: SHIPPED | OUTPATIENT
Start: 2024-03-29 | End: 2024-05-14 | Stop reason: SDUPTHER

## 2024-02-27 RX ORDER — LISDEXAMFETAMINE DIMESYLATE 50 MG/1
50 CAPSULE ORAL EVERY MORNING
Qty: 30 CAPSULE | Refills: 0 | Status: SHIPPED | OUTPATIENT
Start: 2024-02-29 | End: 2024-05-14 | Stop reason: SDUPTHER

## 2024-02-27 NOTE — TELEPHONE ENCOUNTER
----- Message from Mary Saenz sent at 2/27/2024  8:28 AM CST -----  Refill Vyvance 50 mg [facility] on [address] Pt's # [phone] GH

## 2024-02-29 ENCOUNTER — TELEPHONE (OUTPATIENT)
Dept: FAMILY MEDICINE | Facility: CLINIC | Age: 39
End: 2024-02-29
Payer: COMMERCIAL

## 2024-04-05 DIAGNOSIS — F90.0 ATTENTION DEFICIT HYPERACTIVITY DISORDER (ADHD), PREDOMINANTLY INATTENTIVE TYPE: ICD-10-CM

## 2024-04-05 RX ORDER — LISDEXAMFETAMINE DIMESYLATE 50 MG/1
50 CAPSULE ORAL EVERY MORNING
Qty: 30 CAPSULE | Refills: 0 | Status: CANCELLED | OUTPATIENT
Start: 2024-04-05

## 2024-04-08 ENCOUNTER — TELEPHONE (OUTPATIENT)
Dept: FAMILY MEDICINE | Facility: CLINIC | Age: 39
End: 2024-04-08
Payer: COMMERCIAL

## 2024-04-08 NOTE — TELEPHONE ENCOUNTER
Spoke with pt in regards to messages sent. Verbalized to the pt that it looks like there is a prescription for her Vyvanse at the pharmacy. Recommended that she call her pharmacy for the refill. Pt acknowledged understanding.

## 2024-04-08 NOTE — TELEPHONE ENCOUNTER
----- Message from Gudelia Camarillo sent at 4/8/2024 11:58 AM CDT -----  Refill for Vyvanse. Pt #[phone]

## 2024-04-08 NOTE — TELEPHONE ENCOUNTER
Pt is needing a refill on her Vyvanse. Last office visit 11/136/2023. Next office visit 05/13/2024. Chart shows last dispense 03/06/2024. Last UDS done on 05/16/2023.

## 2024-05-01 ENCOUNTER — TELEPHONE (OUTPATIENT)
Dept: FAMILY MEDICINE | Facility: CLINIC | Age: 39
End: 2024-05-01
Payer: COMMERCIAL

## 2024-05-14 ENCOUNTER — OFFICE VISIT (OUTPATIENT)
Dept: FAMILY MEDICINE | Facility: CLINIC | Age: 39
End: 2024-05-14
Payer: COMMERCIAL

## 2024-05-14 VITALS
OXYGEN SATURATION: 99 % | DIASTOLIC BLOOD PRESSURE: 64 MMHG | BODY MASS INDEX: 25.16 KG/M2 | HEART RATE: 76 BPM | WEIGHT: 142 LBS | HEIGHT: 63 IN | SYSTOLIC BLOOD PRESSURE: 110 MMHG

## 2024-05-14 DIAGNOSIS — Z00.00 ANNUAL PHYSICAL EXAM: Primary | ICD-10-CM

## 2024-05-14 DIAGNOSIS — Z51.81 ENCOUNTER FOR THERAPEUTIC DRUG MONITORING: ICD-10-CM

## 2024-05-14 DIAGNOSIS — R20.2 PARESTHESIA OF ARM: ICD-10-CM

## 2024-05-14 DIAGNOSIS — F17.210 CIGARETTE SMOKER: ICD-10-CM

## 2024-05-14 DIAGNOSIS — F90.0 ATTENTION DEFICIT HYPERACTIVITY DISORDER (ADHD), PREDOMINANTLY INATTENTIVE TYPE: ICD-10-CM

## 2024-05-14 DIAGNOSIS — G56.03 BILATERAL CARPAL TUNNEL SYNDROME: ICD-10-CM

## 2024-05-14 DIAGNOSIS — F11.21 OPIOID DEPENDENCE IN REMISSION: ICD-10-CM

## 2024-05-14 DIAGNOSIS — Z79.899 ENCOUNTER FOR LONG-TERM (CURRENT) USE OF OTHER MEDICATIONS: ICD-10-CM

## 2024-05-14 PROCEDURE — 3008F BODY MASS INDEX DOCD: CPT | Mod: CPTII,S$GLB,, | Performed by: NURSE PRACTITIONER

## 2024-05-14 PROCEDURE — 99406 BEHAV CHNG SMOKING 3-10 MIN: CPT | Mod: S$GLB,,, | Performed by: NURSE PRACTITIONER

## 2024-05-14 PROCEDURE — 3078F DIAST BP <80 MM HG: CPT | Mod: CPTII,S$GLB,, | Performed by: NURSE PRACTITIONER

## 2024-05-14 PROCEDURE — 1160F RVW MEDS BY RX/DR IN RCRD: CPT | Mod: CPTII,S$GLB,, | Performed by: NURSE PRACTITIONER

## 2024-05-14 PROCEDURE — 99396 PREV VISIT EST AGE 40-64: CPT | Mod: 25,S$GLB,, | Performed by: NURSE PRACTITIONER

## 2024-05-14 PROCEDURE — 3074F SYST BP LT 130 MM HG: CPT | Mod: CPTII,S$GLB,, | Performed by: NURSE PRACTITIONER

## 2024-05-14 PROCEDURE — 1159F MED LIST DOCD IN RCRD: CPT | Mod: CPTII,S$GLB,, | Performed by: NURSE PRACTITIONER

## 2024-05-14 RX ORDER — LISDEXAMFETAMINE DIMESYLATE 50 MG/1
50 CAPSULE ORAL EVERY MORNING
Qty: 30 CAPSULE | Refills: 0 | Status: SHIPPED | OUTPATIENT
Start: 2024-06-09

## 2024-05-14 RX ORDER — LISDEXAMFETAMINE DIMESYLATE 50 MG/1
50 CAPSULE ORAL EVERY MORNING
Qty: 30 CAPSULE | Refills: 0 | Status: SHIPPED | OUTPATIENT
Start: 2024-07-09

## 2024-05-14 RX ORDER — LISDEXAMFETAMINE DIMESYLATE 50 MG/1
50 CAPSULE ORAL EVERY MORNING
Qty: 30 CAPSULE | Refills: 0 | Status: SHIPPED | OUTPATIENT
Start: 2024-08-08

## 2024-05-14 NOTE — PROGRESS NOTES
SUBJECTIVE:    Patient ID: Pepper Fitzpatrick is a 38 y.o. female.    Chief Complaint: ADHD (No bottles//Pt is here for check up and medication refills//KAMILA )    Pt here for regular 6 month f/u- ADHD    Pt reports overall she's been doing well- stays busy with work and kids.  No complaints today    Continues with bilat hand numbness/burning pain but also has numbness shooting down upper arm at times and has some neck pain. Has previously told she had carpal tunnel and needed surgery which she has declined but now would like to see someone because she is worried it may be her neck also.  Works as a hairdresser     Hx of opioid dependence and has been on suboxone treatment for the past 8 years- now going to [facility] for [facility]one    Sees Dr. Cruz, gyn for Pap. Recently started back on biote supplements with Dr. Montelongo          Office Visit on 02/09/2024   Component Date Value Ref Range Status    SARS Coronavirus 2 Antigen 02/09/2024 Negative  Negative Final     Acceptable 02/09/2024 Yes   Final    Rapid Influenza A Ag 02/09/2024 Negative  Negative Final    Rapid Influenza B Ag 02/09/2024 Negative  Negative Final     Acceptable 02/09/2024 Yes   Final    Rapid Strep A Screen 02/09/2024 Negative  Negative Final     Acceptable 02/09/2024 Yes   Final    Upper Respiratory Culture 02/09/2024 Final report   Final    Result 1 02/09/2024 Comment   Final       Past Medical History:   Diagnosis Date    Abnormal Pap smear     Acute nonintractable headache     Arthralgia     Breast disorder     Carpal tunnel syndrome, bilateral     Chronic constipation     Renal calculi 3/25/2013     Past Surgical History:   Procedure Laterality Date    ABDOMINAL SURGERY      breast augmentation  2006    w/breast lift    BREAST SURGERY  2002    Mastitis    COSMETIC SURGERY      HERNIA REPAIR  2005    UMBILICAL HERNIA REPAIR       Family History   Problem Relation Name Age of  Onset    Lupus Mother      Lupus Sister      Heart disease Maternal Grandmother      Diabetes Maternal Grandmother         Tests to Keep You Healthy    Mammogram: ORDERED BUT NOT SCHEDULED  Cervical Cancer Screening: Met on 9/6/2022  Tobacco Cessation: NO      The 10-year CVD risk score (Margarita et al., 2008) is: 2.2%    Values used to calculate the score:      Age: 38 years      Sex: Female      Diabetic: No      Tobacco smoker: Yes      Systolic Blood Pressure: 110 mmHg      Is BP treated: No      HDL Cholesterol: 50 mg/dL      Total Cholesterol: 148 mg/dL     Marital Status:   Alcohol History:  reports current alcohol use.  Tobacco History:  reports that she has been smoking vaping with nicotine. She has been exposed to tobacco smoke. She has never used smokeless tobacco.  Drug History:  reports no history of drug use.    Health Maintenance Topics with due status: Not Due       Topic Last Completion Date    TETANUS VACCINE 04/08/2015    Cervical Cancer Screening 09/06/2022    Hemoglobin A1c (Diabetic Prevention Screening) 06/05/2023    Influenza Vaccine Not Due     Immunization History   Administered Date(s) Administered    Tdap 04/08/2015       Review of patient's allergies indicates:   Allergen Reactions    Penicillins Blisters and Swelling    Diflucan [fluconazole] Swelling       Current Outpatient Medications:     buprenorphine-naloxone (SUBOXONE) 8-2 mg Film, DIS 1 FILM UNT BID PRN, Disp: , Rfl:     ibuprofen (ADVIL,MOTRIN) 800 MG tablet, Take 1 tablet (800 mg total) by mouth 2 (two) times daily as needed for Pain. (Patient not taking: Reported on 2/9/2024), Disp: 20 tablet, Rfl: 0    [START ON 6/9/2024] lisdexamfetamine (VYVANSE) 50 MG capsule, Take 1 capsule (50 mg total) by mouth every morning., Disp: 30 capsule, Rfl: 0    [START ON 7/9/2024] lisdexamfetamine (VYVANSE) 50 MG capsule, Take 1 capsule (50 mg total) by mouth every morning., Disp: 30 capsule, Rfl: 0    [START ON 8/8/2024]  "lisdexamfetamine (VYVANSE) 50 MG capsule, Take 1 capsule (50 mg total) by mouth every morning., Disp: 30 capsule, Rfl: 0    Review of Systems   Constitutional:  Negative for appetite change, chills, fever and unexpected weight change.   Respiratory:  Negative for cough, shortness of breath and wheezing.    Cardiovascular:  Negative for chest pain, palpitations and leg swelling.   Gastrointestinal:  Negative for abdominal pain, anal bleeding, blood in stool, constipation, diarrhea, nausea and vomiting.   Genitourinary:  Negative for dysuria, frequency, hematuria and menstrual problem (regular cycle, UTD with pap).   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 Negative for gait problem.   Skin:  Negative for rash.   Neurological:  Positive for numbness (numbness bilat hands and at times involves her entire arm). Negative for dizziness, syncope, weakness and headaches.   Psychiatric/Behavioral:  Negative for decreased concentration (stable on vyvanse) and dysphoric mood. The patient is not nervous/anxious.           Objective:      Vitals:    05/14/24 0838   BP: 110/64   Pulse: 76   SpO2: 99%   Weight: 64.4 kg (142 lb)   Height: 5' 3" (1.6 m)     Physical Exam  Vitals reviewed.   Constitutional:       General: She is not in acute distress.     Appearance: Normal appearance. She is well-developed.   HENT:      Head: Normocephalic and atraumatic.      Right Ear: Tympanic membrane and ear canal normal.      Left Ear: Tympanic membrane and ear canal normal.   Neck:      Vascular: No carotid bruit.   Cardiovascular:      Rate and Rhythm: Normal rate and regular rhythm.      Heart sounds: No murmur heard.  Pulmonary:      Effort: Pulmonary effort is normal. No respiratory distress.      Breath sounds: Normal breath sounds. No wheezing or rales.   Abdominal:      General: There is no distension.      Palpations: Abdomen is soft.      Tenderness: " There is no abdominal tenderness.   Musculoskeletal:      Cervical back: Neck supple.      Right lower leg: No edema.      Left lower leg: No edema.   Lymphadenopathy:      Cervical: No cervical adenopathy.   Skin:     General: Skin is warm and dry.      Findings: No rash.   Neurological:      General: No focal deficit present.      Mental Status: She is alert and oriented to person, place, and time.      Motor: Motor function is intact.      Gait: Gait normal.   Psychiatric:         Mood and Affect: Mood normal.           Assessment:       1. Annual physical exam    2. Bilateral carpal tunnel syndrome    3. Attention deficit hyperactivity disorder (ADHD), predominantly inattentive type    4. Paresthesia of arm    5. Opioid dependence in remission    6. Encounter for therapeutic drug monitoring    7. Encounter for long-term (current) use of other medications           Plan:       1. Annual physical exam   -encouraged patient to schedule annual screening mammogram    2. Bilateral carpal tunnel syndrome  -will refer to Neurology for EMG/NCS and advised they can also evaluate her neck  -     Ambulatory referral/consult to Neurology; Future; Expected date: 05/21/2024    3. Attention deficit hyperactivity disorder (ADHD), predominantly inattentive type  -stable on med,  reviewed, UDS today  -     DRUG MONITOR, PANEL 4, W/CONF, URINE; Future; Expected date: 05/14/2024  -     lisdexamfetamine (VYVANSE) 50 MG capsule; Take 1 capsule (50 mg total) by mouth every morning.  Dispense: 30 capsule; Refill: 0  -     lisdexamfetamine (VYVANSE) 50 MG capsule; Take 1 capsule (50 mg total) by mouth every morning.  Dispense: 30 capsule; Refill: 0  -     lisdexamfetamine (VYVANSE) 50 MG capsule; Take 1 capsule (50 mg total) by mouth every morning.  Dispense: 30 capsule; Refill: 0    4. Paresthesia of arm  -     Ambulatory referral/consult to Neurology; Future; Expected date: 05/21/2024    5. Opioid dependence in remission   -stable  on Suboxone under care of [facility]al [facility]    6. Encounter for therapeutic drug monitoring  -     DRUG MONITOR, PANEL 4, W/CONF, URINE; Future; Expected date: 05/14/2024    7. Encounter for long-term (current) use of other medications  -     DRUG MONITOR, PANEL 4, W/CONF, URINE; Future; Expected date: 05/14/2024    8. Cigarette smoker   -smoking cessation counseling provided  Assistance with smoking cessation was offered, including:  []  Medications  [x]  Counseling  []  Printed Information on Smoking Cessation  [x]  Referral to a Smoking Cessation Program    Patient was counseled regarding smoking for 3-10 minutes.     Follow up in 6 months (on 11/14/2024) for UDS today, ADHD.          Counseled on age and gender appropriate medical preventative services, including cancer screenings, immunizations, overall nutritional health, need for a consistent exercise regimen and an overall push towards maintaining a vigorous and active lifestyle.      5/14/2024 Gudelia Willard NP

## 2024-05-14 NOTE — PATIENT INSTRUCTIONS
[facility] at [phone] to schedule mammogram    Please return for fasting bloodwork within the next 2 weeks    Thomas Lima MD- neurology- call to schedule appointment  [address] [place] [id number]  Phone: [phone]

## 2024-05-17 LAB
AMPHET UR-MCNC: ABNORMAL NG/ML
AMPHETAMINES UR QL: POSITIVE NG/ML
BARBITURATES UR QL: NEGATIVE NG/ML
BENZODIAZ UR QL: NEGATIVE NG/ML
BZE UR QL: NEGATIVE NG/ML
CODEINE UR-MCNC: NEGATIVE NG/ML
CREAT UR-MCNC: 164 MG/DL
DRUG SCREEN COMMENT UR-IMP: ABNORMAL
DRUG SCREEN COMMENT UR-IMP: ABNORMAL
HYDROCODONE UR-MCNC: NEGATIVE NG/ML
HYDROMORPHONE UR-MCNC: NEGATIVE NG/ML
METHADONE UR QL: NEGATIVE NG/ML
METHAMPHET UR-MCNC: NEGATIVE NG/ML
MORPHINE UR-MCNC: NEGATIVE NG/ML
NORHYDROCODONE UR CFM-MCNC: NEGATIVE NG/ML
NOTES AND COMMENTS: ABNORMAL
OPIATES UR QL: ABNORMAL NG/ML
OXIDANTS UR QL: NEGATIVE MCG/ML
OXYCODONE UR QL: NEGATIVE NG/ML
PCP UR QL: NEGATIVE NG/ML
PH UR: 5.3 [PH] (ref 4.5–9)

## 2024-06-10 ENCOUNTER — TELEPHONE (OUTPATIENT)
Dept: FAMILY MEDICINE | Facility: CLINIC | Age: 39
End: 2024-06-10
Payer: COMMERCIAL

## 2024-06-10 DIAGNOSIS — E55.9 VITAMIN D DEFICIENCY: Primary | ICD-10-CM

## 2024-06-10 RX ORDER — ACETAMINOPHEN 500 MG
2000 TABLET ORAL DAILY
COMMUNITY

## 2024-06-10 RX ORDER — ERGOCALCIFEROL 1.25 MG/1
50000 CAPSULE ORAL
Qty: 8 CAPSULE | Refills: 0 | Status: SHIPPED | OUTPATIENT
Start: 2024-06-10

## 2024-06-10 NOTE — TELEPHONE ENCOUNTER
Spoke with pt in regards to recent lab results. Verbalized verbatim per Gudelia. Pt acknowledged understanding.       Medication list updated.

## 2024-06-10 NOTE — TELEPHONE ENCOUNTER
----- Message from Gudelia Willard NP sent at 6/9/2024  7:44 PM CDT -----  Please call pt and let her know recent labs show her vitamin D level is low- recommend treating with prescription strength Vitamin D 94490 units once a week for 8 weeks then she needs to take otc vitmin D3 2000 units daily.  Blood sugar was slightly elevated at 106- diabetes test, A1C is 5.6% which is in normal range but right on the edge of prediabetes- recommend cutting out processed sugars and carbs.  The rest of her labs look good- kidney, liver, thyroid and blood count all in normal range. Cholesterol levels are well controlled. No infection or blood in urine.

## 2024-09-05 DIAGNOSIS — F90.0 ATTENTION DEFICIT HYPERACTIVITY DISORDER (ADHD), PREDOMINANTLY INATTENTIVE TYPE: ICD-10-CM

## 2024-09-05 RX ORDER — LISDEXAMFETAMINE DIMESYLATE 50 MG/1
50 CAPSULE ORAL EVERY MORNING
Qty: 30 CAPSULE | Refills: 0 | Status: SHIPPED | OUTPATIENT
Start: 2024-10-07

## 2024-09-05 RX ORDER — LISDEXAMFETAMINE DIMESYLATE 50 MG/1
50 CAPSULE ORAL EVERY MORNING
Qty: 30 CAPSULE | Refills: 0 | Status: SHIPPED | OUTPATIENT
Start: 2024-09-07

## 2024-09-05 NOTE — TELEPHONE ENCOUNTER
----- Message from Enedina Moser sent at 9/5/2024  3:19 PM CDT -----  Pt needs refill on vyvanse   [facility] [address]   [phone]       ran - last filled 8/9

## 2024-09-09 ENCOUNTER — TELEPHONE (OUTPATIENT)
Dept: FAMILY MEDICINE | Facility: CLINIC | Age: 39
End: 2024-09-09
Payer: COMMERCIAL

## 2024-09-09 DIAGNOSIS — F90.0 ATTENTION DEFICIT HYPERACTIVITY DISORDER (ADHD), PREDOMINANTLY INATTENTIVE TYPE: ICD-10-CM

## 2024-09-09 RX ORDER — LISDEXAMFETAMINE DIMESYLATE 50 MG/1
50 CAPSULE ORAL EVERY MORNING
Qty: 30 CAPSULE | Refills: 0 | Status: SHIPPED | OUTPATIENT
Start: 2024-10-09

## 2024-09-09 RX ORDER — LISDEXAMFETAMINE DIMESYLATE 50 MG/1
50 CAPSULE ORAL EVERY MORNING
Qty: 30 CAPSULE | Refills: 0 | Status: SHIPPED | OUTPATIENT
Start: 2024-09-09

## 2024-09-09 NOTE — TELEPHONE ENCOUNTER
The 9/7/24 and 10/7/24 future orders were placed @ [facility], she wants [facility] [address].  Verified on  last filled 8/9/24 @ [facility]

## 2024-09-09 NOTE — TELEPHONE ENCOUNTER
----- Message from Enedina Moser sent at 9/9/2024  1:44 PM CDT -----  Vm- 1:42- pt vyvanse was called into a different pharmacy and they do not have it. Can you call it into [facility]   [phone]

## 2024-09-10 ENCOUNTER — TELEPHONE (OUTPATIENT)
Dept: FAMILY MEDICINE | Facility: CLINIC | Age: 39
End: 2024-09-10
Payer: COMMERCIAL

## 2024-09-10 NOTE — TELEPHONE ENCOUNTER
----- Message from Gudelia Camarillo sent at 9/10/2024 12:59 PM CDT -----  Refill for Vyvanse needs to be sent to [facility] [address]. Pt #[phone]

## 2024-09-10 NOTE — TELEPHONE ENCOUNTER
Explained she will need to find out who has in stock or get written Rx's, can not send to a third location. She will try to call [facility] and see how long of a delay and if they can tell her if [facility] has it

## 2024-11-11 ENCOUNTER — OFFICE VISIT (OUTPATIENT)
Dept: FAMILY MEDICINE | Facility: CLINIC | Age: 39
End: 2024-11-11
Payer: COMMERCIAL

## 2024-11-11 VITALS
OXYGEN SATURATION: 100 % | HEART RATE: 80 BPM | DIASTOLIC BLOOD PRESSURE: 72 MMHG | SYSTOLIC BLOOD PRESSURE: 100 MMHG | WEIGHT: 140.38 LBS | BODY MASS INDEX: 24.88 KG/M2 | HEIGHT: 63 IN

## 2024-11-11 DIAGNOSIS — F17.290 VAPING NICOTINE DEPENDENCE, TOBACCO PRODUCT: ICD-10-CM

## 2024-11-11 DIAGNOSIS — Z12.31 ENCOUNTER FOR SCREENING MAMMOGRAM FOR BREAST CANCER: ICD-10-CM

## 2024-11-11 DIAGNOSIS — G56.03 BILATERAL CARPAL TUNNEL SYNDROME: ICD-10-CM

## 2024-11-11 DIAGNOSIS — F90.0 ATTENTION DEFICIT HYPERACTIVITY DISORDER (ADHD), PREDOMINANTLY INATTENTIVE TYPE: Primary | ICD-10-CM

## 2024-11-11 PROCEDURE — 99406 BEHAV CHNG SMOKING 3-10 MIN: CPT | Mod: S$GLB,,, | Performed by: NURSE PRACTITIONER

## 2024-11-11 PROCEDURE — 3074F SYST BP LT 130 MM HG: CPT | Mod: CPTII,S$GLB,, | Performed by: NURSE PRACTITIONER

## 2024-11-11 PROCEDURE — 3044F HG A1C LEVEL LT 7.0%: CPT | Mod: CPTII,S$GLB,, | Performed by: NURSE PRACTITIONER

## 2024-11-11 PROCEDURE — 3078F DIAST BP <80 MM HG: CPT | Mod: CPTII,S$GLB,, | Performed by: NURSE PRACTITIONER

## 2024-11-11 PROCEDURE — 99214 OFFICE O/P EST MOD 30 MIN: CPT | Mod: 25,S$GLB,, | Performed by: NURSE PRACTITIONER

## 2024-11-11 PROCEDURE — 1160F RVW MEDS BY RX/DR IN RCRD: CPT | Mod: CPTII,S$GLB,, | Performed by: NURSE PRACTITIONER

## 2024-11-11 PROCEDURE — 3008F BODY MASS INDEX DOCD: CPT | Mod: CPTII,S$GLB,, | Performed by: NURSE PRACTITIONER

## 2024-11-11 PROCEDURE — 1159F MED LIST DOCD IN RCRD: CPT | Mod: CPTII,S$GLB,, | Performed by: NURSE PRACTITIONER

## 2024-11-11 RX ORDER — LISDEXAMFETAMINE DIMESYLATE 50 MG/1
50 CAPSULE ORAL EVERY MORNING
Qty: 30 CAPSULE | Refills: 0 | Status: SHIPPED | OUTPATIENT
Start: 2024-12-11

## 2024-11-11 RX ORDER — LISDEXAMFETAMINE DIMESYLATE 50 MG/1
50 CAPSULE ORAL EVERY MORNING
Qty: 30 CAPSULE | Refills: 0 | Status: SHIPPED | OUTPATIENT
Start: 2024-11-11

## 2024-11-11 RX ORDER — LISDEXAMFETAMINE DIMESYLATE 50 MG/1
50 CAPSULE ORAL EVERY MORNING
Qty: 30 CAPSULE | Refills: 0 | Status: SHIPPED | OUTPATIENT
Start: 2025-01-10

## 2024-11-11 NOTE — PROGRESS NOTES
SUBJECTIVE:    Patient ID: Pepper Fitzpatrick is a 38 y.o. female.    Chief Complaint: ADHD (Bottle brought//Pt is here for a check up and medication refills.//mammo ordered today//Pt decline flu vaccine//KAMILA)    Pt here for regular 6 month f/u- ADHD    Pt reports overall she's been doing well- stays busy with work and kids.  No complaints today    Continues with bilat hand numbness/burning pain and previously diagnosed with carpal tunnel but can't really take time off work for surgery. Works as a hairdresser     Hx of opioid dependence and has been on suboxone treatment for the past 8 years- now going to [facility] for suboxone    Sees Dr. Cruz, gyn for Pap. Has been on biote supplements with Dr. Montelongo which she thought made a big difference with the testosterone but cost is a factor    Vapes occasionally, no longer smoking cigarettes          Office Visit on 05/14/2024   Component Date Value Ref Range Status    Amphetamines 05/14/2024 POSITIVE (A)  <500 ng/mL Final    Amphetamine 05/14/2024 10,604 (H)  <250 ng/mL Final    Methamphetamine 05/14/2024 NEGATIVE  <250 ng/mL Final    Amphetamines Comments 05/14/2024    Final    Barbiturates 05/14/2024 NEGATIVE  <300 ng/mL Final    Benzodiazepines 05/14/2024 NEGATIVE  <100 ng/mL Final    Cocaine Metabolites 05/14/2024 NEGATIVE  <150 ng/mL Final    Methadone 05/14/2024 NEGATIVE  <100 ng/mL Final    Opiates 05/14/2024 NEGATIVE CONFIRMED  <100 ng/mL Final    Codeine 05/14/2024 NEGATIVE  <50 ng/mL Final    Hydrocodone 05/14/2024 NEGATIVE  <50 ng/mL Final    Hydromorphone 05/14/2024 NEGATIVE  <50 ng/mL Final    Morphine 05/14/2024 NEGATIVE  <50 ng/mL Final    NORHYDROCODONE 05/14/2024 NEGATIVE  <50 ng/mL Final    Opiates Comments 05/14/2024    Final    Oxycodone 05/14/2024 NEGATIVE  <100 ng/mL Final    Phencyclidine 05/14/2024 NEGATIVE  <25 ng/mL Final    Creatinine 05/14/2024 164.0  > or = 20.0 mg/dL Final    pH 05/14/2024 5.3  4.5 - 9.0 Final     Oxidants, Urine (Tox) 05/14/2024 NEGATIVE  <200 mcg/mL Final    Notes and Comments 05/14/2024    Final       Past Medical History:   Diagnosis Date    Abnormal Pap smear     Acute nonintractable headache     Arthralgia     Breast disorder     Carpal tunnel syndrome, bilateral     Chronic constipation     Renal calculi 3/25/2013     Past Surgical History:   Procedure Laterality Date    ABDOMINAL SURGERY      breast augmentation  2006    w/breast lift    BREAST SURGERY  2002    Mastitis    COSMETIC SURGERY      HERNIA REPAIR  2005    UMBILICAL HERNIA REPAIR       Family History   Problem Relation Name Age of Onset    Lupus Mother      Lupus Sister      Heart disease Maternal Grandmother      Diabetes Maternal Grandmother         Tests to Keep You Healthy    Mammogram: SCHEDULED  Cervical Cancer Screening: Met on 9/6/2022  Tobacco Cessation: NO      The 10-year CVD risk score (Margarita, et al., 2008) is: 1.7%    Values used to calculate the score:      Age: 38 years      Sex: Female      Diabetic: No      Tobacco smoker: Yes      Systolic Blood Pressure: 100 mmHg      Is BP treated: No      HDL Cholesterol: 54 mg/dL      Total Cholesterol: 153 mg/dL     Marital Status:   Alcohol History:  reports current alcohol use.  Tobacco History:  reports that she has been smoking vaping with nicotine. She has been exposed to tobacco smoke. She has never used smokeless tobacco.  Drug History:  reports no history of drug use.    Health Maintenance Topics with due status: Not Due       Topic Last Completion Date    TETANUS VACCINE 04/08/2015    Cervical Cancer Screening 09/06/2022    RSV Vaccine (Age 60+ and Pregnant patients) Not Due     Immunization History   Administered Date(s) Administered    Tdap 04/08/2015       Review of patient's allergies indicates:   Allergen Reactions    Penicillins Blisters and Swelling    Diflucan [fluconazole] Swelling       Current Outpatient Medications:     buprenorphine-naloxone  (SUBOXONE) 8-2 mg Film, DIS 1 FILM UNT BID PRN, Disp: , Rfl:     cholecalciferol, vitamin D3, (VITAMIN D3) 50 mcg (2,000 unit) Cap capsule, Take 2,000 Units by mouth once daily., Disp: , Rfl:     ergocalciferol (ERGOCALCIFEROL) 50,000 unit Cap, Take 1 capsule (50,000 Units total) by mouth every 7 days. For 8 weeks then begin OTC vitamin D3 2000 units daily, Disp: 8 capsule, Rfl: 0    ibuprofen (ADVIL,MOTRIN) 800 MG tablet, Take 1 tablet (800 mg total) by mouth 2 (two) times daily as needed for Pain., Disp: 20 tablet, Rfl: 0    lisdexamfetamine (VYVANSE) 50 MG capsule, Take 1 capsule (50 mg total) by mouth every morning., Disp: 30 capsule, Rfl: 0    lisdexamfetamine (VYVANSE) 50 MG capsule, Take 1 capsule (50 mg total) by mouth every morning., Disp: 30 capsule, Rfl: 0    lisdexamfetamine (VYVANSE) 50 MG capsule, Take 1 capsule (50 mg total) by mouth every morning., Disp: 30 capsule, Rfl: 0    lisdexamfetamine (VYVANSE) 50 MG capsule, Take 1 capsule (50 mg total) by mouth every morning., Disp: 30 capsule, Rfl: 0    [START ON 12/11/2024] lisdexamfetamine (VYVANSE) 50 MG capsule, Take 1 capsule (50 mg total) by mouth every morning., Disp: 30 capsule, Rfl: 0    [START ON 1/10/2025] lisdexamfetamine (VYVANSE) 50 MG capsule, Take 1 capsule (50 mg total) by mouth every morning., Disp: 30 capsule, Rfl: 0    Review of Systems   Constitutional:  Negative for appetite change, chills, fever and unexpected weight change.   Respiratory:  Negative for cough, shortness of breath and wheezing.    Cardiovascular:  Negative for chest pain, palpitations and leg swelling.   Gastrointestinal:  Negative for abdominal pain, anal bleeding, blood in stool, constipation, diarrhea, nausea and vomiting.   Genitourinary:  Negative for dysuria, frequency, hematuria and menstrual problem.   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 Negative for gait problem.   Skin:   "Negative for rash.   Neurological:  Positive for numbness (numbness bilat hands and at times involves her entire arm). Negative for dizziness, syncope, weakness and headaches.   Psychiatric/Behavioral:  Negative for decreased concentration (stable on vyvanse) and dysphoric mood. The patient is not nervous/anxious.           Objective:      Vitals:    11/11/24 1023   BP: 100/72   Pulse: 80   SpO2: 100%   Weight: 63.7 kg (140 lb 6.4 oz)   Height: 5' 3" (1.6 m)     Physical Exam  Vitals reviewed.   Constitutional:       General: She is not in acute distress.     Appearance: Normal appearance. She is well-developed.   HENT:      Head: Normocephalic and atraumatic.      Right Ear: Tympanic membrane and ear canal normal.      Left Ear: Tympanic membrane and ear canal normal.   Neck:      Vascular: No carotid bruit.   Cardiovascular:      Rate and Rhythm: Normal rate and regular rhythm.      Heart sounds: No murmur heard.  Pulmonary:      Effort: Pulmonary effort is normal. No respiratory distress.      Breath sounds: Normal breath sounds. No wheezing or rales.   Abdominal:      General: There is no distension.      Palpations: Abdomen is soft.      Tenderness: There is no abdominal tenderness.   Musculoskeletal:      Cervical back: Neck supple.      Right lower leg: No edema.      Left lower leg: No edema.   Lymphadenopathy:      Cervical: No cervical adenopathy.   Skin:     General: Skin is warm and dry.      Findings: No rash.   Neurological:      General: No focal deficit present.      Mental Status: She is alert and oriented to person, place, and time.      Motor: Motor function is intact.      Gait: Gait normal.   Psychiatric:         Mood and Affect: Mood normal.           Assessment:       1. Attention deficit hyperactivity disorder (ADHD), predominantly inattentive type    2. Bilateral carpal tunnel syndrome    3. Encounter for screening mammogram for breast cancer    4. Vaping nicotine dependence, tobacco product "           Plan:       1. Attention deficit hyperactivity disorder (ADHD), predominantly inattentive type  -stable on meds  -     lisdexamfetamine (VYVANSE) 50 MG capsule; Take 1 capsule (50 mg total) by mouth every morning.  Dispense: 30 capsule; Refill: 0  -     lisdexamfetamine (VYVANSE) 50 MG capsule; Take 1 capsule (50 mg total) by mouth every morning.  Dispense: 30 capsule; Refill: 0  -     lisdexamfetamine (VYVANSE) 50 MG capsule; Take 1 capsule (50 mg total) by mouth every morning.  Dispense: 30 capsule; Refill: 0    2. Bilateral carpal tunnel syndrome   -stable, patient has declined surgery.  Encouraged her to wear wrist splints    3. Encounter for screening mammogram for breast cancer  -discussed importance of breast cancer screening encouraged her to schedule mammogram  -     Mammo Digital Screening Bilat; Future; Expected date: 11/11/2024    4. Vaping nicotine dependence, tobacco product  Assistance with smoking cessation was offered, including:  [x]  Medications  [x]  Counseling  []  Printed Information on Smoking Cessation  []  Referral to a Smoking Cessation Program    Patient was counseled regarding smoking for 3-10 minutes.     Follow up in about 6 months (around 5/11/2025) for ADHD.          Counseled on age and gender appropriate medical preventative services, including cancer screenings, immunizations, overall nutritional health, need for a consistent exercise regimen and an overall push towards maintaining a vigorous and active lifestyle.      11/11/2024 Gudelia Willard NP

## 2024-11-18 ENCOUNTER — HOSPITAL ENCOUNTER (OUTPATIENT)
Dept: RADIOLOGY | Facility: HOSPITAL | Age: 39
Discharge: HOME | End: 2024-11-18
Attending: NURSE PRACTITIONER
Payer: COMMERCIAL

## 2024-11-18 VITALS — WEIGHT: 140 LBS | BODY MASS INDEX: 24.8 KG/M2 | HEIGHT: 63 IN

## 2024-11-18 DIAGNOSIS — Z12.31 ENCOUNTER FOR SCREENING MAMMOGRAM FOR BREAST CANCER: ICD-10-CM

## 2024-11-18 PROCEDURE — 77063 BREAST TOMOSYNTHESIS BI: CPT | Mod: TC,PO

## 2024-11-18 PROCEDURE — 77063 BREAST TOMOSYNTHESIS BI: CPT | Mod: 26,,, | Performed by: RADIOLOGY

## 2024-11-18 PROCEDURE — 77067 SCR MAMMO BI INCL CAD: CPT | Mod: 26,,, | Performed by: RADIOLOGY

## 2024-12-03 DIAGNOSIS — F90.0 ATTENTION DEFICIT HYPERACTIVITY DISORDER (ADHD), PREDOMINANTLY INATTENTIVE TYPE: ICD-10-CM

## 2024-12-03 RX ORDER — LISDEXAMFETAMINE DIMESYLATE 50 MG/1
50 CAPSULE ORAL EVERY MORNING
Qty: 30 CAPSULE | Refills: 0 | Status: SHIPPED | OUTPATIENT
Start: 2025-02-01

## 2024-12-03 RX ORDER — LISDEXAMFETAMINE DIMESYLATE 50 MG/1
50 CAPSULE ORAL EVERY MORNING
Qty: 30 CAPSULE | Refills: 0 | Status: SHIPPED | OUTPATIENT
Start: 2025-01-02

## 2024-12-03 RX ORDER — LISDEXAMFETAMINE DIMESYLATE 50 MG/1
50 CAPSULE ORAL EVERY MORNING
Qty: 30 CAPSULE | Refills: 0 | Status: SHIPPED | OUTPATIENT
Start: 2024-12-03

## 2024-12-03 NOTE — TELEPHONE ENCOUNTER
----- Message from Enedina sent at 12/3/2024 11:53 AM CST -----  Pt needs vyvanse called into   [facility]   [phone]

## 2025-01-28 ENCOUNTER — TELEPHONE (OUTPATIENT)
Dept: FAMILY MEDICINE | Facility: CLINIC | Age: 40
End: 2025-01-28
Payer: COMMERCIAL

## 2025-01-28 NOTE — TELEPHONE ENCOUNTER
last fill date: 12/16/2024  Also filled on 12/7/2024  So should still have a Feb on file. Told her we can not send any more will have to fill the February rx on file    Called [facility] about fill dates that seem too soon. Must have had one filled @ [facility], has 2 refills on file @[facility] so will reach out to them

## 2025-01-28 NOTE — TELEPHONE ENCOUNTER
----- Message from Enedina sent at 1/28/2025  1:39 PM CST -----  - 1:15- pt is calling to get refill on vyvanse  [phone]

## 2025-03-14 DIAGNOSIS — F90.0 ATTENTION DEFICIT HYPERACTIVITY DISORDER (ADHD), PREDOMINANTLY INATTENTIVE TYPE: ICD-10-CM

## 2025-03-14 RX ORDER — LISDEXAMFETAMINE DIMESYLATE 50 MG/1
50 CAPSULE ORAL EVERY MORNING
Qty: 30 CAPSULE | Refills: 0 | Status: SHIPPED | OUTPATIENT
Start: 2025-05-09

## 2025-03-14 RX ORDER — LISDEXAMFETAMINE DIMESYLATE 50 MG/1
50 CAPSULE ORAL EVERY MORNING
Qty: 30 CAPSULE | Refills: 0 | Status: SHIPPED | OUTPATIENT
Start: 2025-03-14

## 2025-03-14 RX ORDER — LISDEXAMFETAMINE DIMESYLATE 50 MG/1
50 CAPSULE ORAL EVERY MORNING
Qty: 30 CAPSULE | Refills: 0 | Status: SHIPPED | OUTPATIENT
Start: 2025-04-11

## 2025-03-14 NOTE — TELEPHONE ENCOUNTER
----- Message from Mary sent at 3/14/2025 10:52 AM CDT -----  Refill Vyvance  3 separate prescriptions sent to  [facility] on [address] Pt's # [phone] GH

## 2025-04-10 ENCOUNTER — TELEPHONE (OUTPATIENT)
Dept: FAMILY MEDICINE | Facility: CLINIC | Age: 40
End: 2025-04-10
Payer: COMMERCIAL

## 2025-04-10 RX ORDER — CLINDAMYCIN HYDROCHLORIDE 300 MG/1
300 CAPSULE ORAL EVERY 8 HOURS
Qty: 21 CAPSULE | Refills: 0 | Status: SHIPPED | OUTPATIENT
Start: 2025-04-10 | End: 2025-04-17

## 2025-04-10 NOTE — TELEPHONE ENCOUNTER
----- Message from Enedina sent at 4/10/2025  2:31 PM CDT -----  Pt has an infected tooth that has to be taken out and she can not get in till next week with the dentist. Would like to know if we can call in antibiotics. Allergic to penicillin [facility] [phone]

## 2025-05-12 ENCOUNTER — OFFICE VISIT (OUTPATIENT)
Dept: FAMILY MEDICINE | Facility: CLINIC | Age: 40
End: 2025-05-12
Payer: COMMERCIAL

## 2025-05-12 VITALS
HEART RATE: 79 BPM | HEIGHT: 63 IN | DIASTOLIC BLOOD PRESSURE: 70 MMHG | WEIGHT: 125.81 LBS | SYSTOLIC BLOOD PRESSURE: 120 MMHG | OXYGEN SATURATION: 100 % | BODY MASS INDEX: 22.29 KG/M2

## 2025-05-12 DIAGNOSIS — Z51.81 ENCOUNTER FOR THERAPEUTIC DRUG MONITORING: ICD-10-CM

## 2025-05-12 DIAGNOSIS — R63.4 UNINTENTIONAL WEIGHT LOSS: ICD-10-CM

## 2025-05-12 DIAGNOSIS — F11.21 OPIOID DEPENDENCE IN REMISSION: ICD-10-CM

## 2025-05-12 DIAGNOSIS — E55.9 VITAMIN D DEFICIENCY: ICD-10-CM

## 2025-05-12 DIAGNOSIS — Z79.899 ENCOUNTER FOR LONG-TERM (CURRENT) USE OF MEDICATIONS: ICD-10-CM

## 2025-05-12 DIAGNOSIS — F90.0 ATTENTION DEFICIT HYPERACTIVITY DISORDER (ADHD), PREDOMINANTLY INATTENTIVE TYPE: Primary | ICD-10-CM

## 2025-05-12 PROCEDURE — 99214 OFFICE O/P EST MOD 30 MIN: CPT | Mod: S$GLB,,, | Performed by: NURSE PRACTITIONER

## 2025-05-12 PROCEDURE — 3008F BODY MASS INDEX DOCD: CPT | Mod: CPTII,S$GLB,, | Performed by: NURSE PRACTITIONER

## 2025-05-12 PROCEDURE — 1159F MED LIST DOCD IN RCRD: CPT | Mod: CPTII,S$GLB,, | Performed by: NURSE PRACTITIONER

## 2025-05-12 PROCEDURE — 3074F SYST BP LT 130 MM HG: CPT | Mod: CPTII,S$GLB,, | Performed by: NURSE PRACTITIONER

## 2025-05-12 PROCEDURE — 1160F RVW MEDS BY RX/DR IN RCRD: CPT | Mod: CPTII,S$GLB,, | Performed by: NURSE PRACTITIONER

## 2025-05-12 PROCEDURE — 3078F DIAST BP <80 MM HG: CPT | Mod: CPTII,S$GLB,, | Performed by: NURSE PRACTITIONER

## 2025-05-12 RX ORDER — LISDEXAMFETAMINE DIMESYLATE 50 MG/1
50 CAPSULE ORAL EVERY MORNING
Qty: 30 CAPSULE | Refills: 0 | Status: SHIPPED | OUTPATIENT
Start: 2025-06-11

## 2025-05-12 RX ORDER — LISDEXAMFETAMINE DIMESYLATE 50 MG/1
50 CAPSULE ORAL EVERY MORNING
Qty: 30 CAPSULE | Refills: 0 | Status: SHIPPED | OUTPATIENT
Start: 2025-05-12

## 2025-05-12 RX ORDER — LISDEXAMFETAMINE DIMESYLATE 50 MG/1
50 CAPSULE ORAL EVERY MORNING
Qty: 30 CAPSULE | Refills: 0 | Status: SHIPPED | OUTPATIENT
Start: 2025-07-11

## 2025-05-14 LAB
25(OH)D3+25(OH)D2 SERPL-MCNC: 25 NG/ML (ref 30–100)
ALBUMIN SERPL-MCNC: 4.8 G/DL (ref 3.6–5.1)
ALBUMIN/GLOB SERPL: 1.9 (CALC) (ref 1–2.5)
ALP SERPL-CCNC: 58 U/L (ref 31–125)
ALT SERPL-CCNC: 13 U/L (ref 6–29)
AMPHET UR-MCNC: 3968 NG/ML
AMPHETAMINES UR QL: POSITIVE NG/ML
AST SERPL-CCNC: 19 U/L (ref 10–30)
BARBITURATES UR QL: NEGATIVE NG/ML
BASOPHILS # BLD AUTO: 38 CELLS/UL (ref 0–200)
BASOPHILS NFR BLD AUTO: 0.8 %
BENZODIAZ UR QL: NEGATIVE NG/ML
BILIRUB SERPL-MCNC: 0.6 MG/DL (ref 0.2–1.2)
BUN SERPL-MCNC: 13 MG/DL (ref 7–25)
BUN/CREAT SERPL: ABNORMAL (CALC) (ref 6–22)
BZE UR QL: NEGATIVE NG/ML
CALCIUM SERPL-MCNC: 9.7 MG/DL (ref 8.6–10.2)
CHLORIDE SERPL-SCNC: 102 MMOL/L (ref 98–110)
CHOLEST SERPL-MCNC: 176 MG/DL
CHOLEST/HDLC SERPL: 2.6 (CALC)
CO2 SERPL-SCNC: 33 MMOL/L (ref 20–32)
CODEINE UR-MCNC: NEGATIVE NG/ML
CREAT SERPL-MCNC: 0.61 MG/DL (ref 0.5–0.99)
CREAT UR-MCNC: 126.7 MG/DL
DRUG SCREEN COMMENT UR-IMP: ABNORMAL
DRUG SCREEN COMMENT UR-IMP: ABNORMAL
EGFR: 115 ML/MIN/1.73M2
EOSINOPHIL # BLD AUTO: 130 CELLS/UL (ref 15–500)
EOSINOPHIL NFR BLD AUTO: 2.7 %
ERYTHROCYTE [DISTWIDTH] IN BLOOD BY AUTOMATED COUNT: 11.8 % (ref 11–15)
GLOBULIN SER CALC-MCNC: 2.5 G/DL (CALC) (ref 1.9–3.7)
GLUCOSE SERPL-MCNC: 88 MG/DL (ref 65–99)
HBA1C MFR BLD: 5.5 %
HCT VFR BLD AUTO: 42.5 % (ref 35–45)
HDLC SERPL-MCNC: 69 MG/DL
HGB BLD-MCNC: 14.3 G/DL (ref 11.7–15.5)
HYDROCODONE UR-MCNC: NEGATIVE NG/ML
HYDROMORPHONE UR-MCNC: NEGATIVE NG/ML
LDLC SERPL CALC-MCNC: 91 MG/DL (CALC)
LYMPHOCYTES # BLD AUTO: 1517 CELLS/UL (ref 850–3900)
LYMPHOCYTES NFR BLD AUTO: 31.6 %
MCH RBC QN AUTO: 31.4 PG (ref 27–33)
MCHC RBC AUTO-ENTMCNC: 33.6 G/DL (ref 32–36)
MCV RBC AUTO: 93.4 FL (ref 80–100)
METHADONE UR QL: NEGATIVE NG/ML
METHAMPHET UR-MCNC: NEGATIVE NG/ML
MONOCYTES # BLD AUTO: 475 CELLS/UL (ref 200–950)
MONOCYTES NFR BLD AUTO: 9.9 %
MORPHINE UR-MCNC: NEGATIVE NG/ML
NEUTROPHILS # BLD AUTO: 2640 CELLS/UL (ref 1500–7800)
NEUTROPHILS NFR BLD AUTO: 55 %
NONHDLC SERPL-MCNC: 107 MG/DL (CALC)
NORHYDROCODONE UR CFM-MCNC: NEGATIVE NG/ML
NOTES AND COMMENTS: ABNORMAL
OPIATES UR QL: ABNORMAL NG/ML
OXIDANTS UR QL: NEGATIVE MCG/ML
OXYCODONE UR QL: NEGATIVE NG/ML
PCP UR QL: NEGATIVE NG/ML
PH UR: 7.1 [PH] (ref 4.5–9)
PLATELET # BLD AUTO: 181 THOUSAND/UL (ref 140–400)
PMV BLD REES-ECKER: 11.8 FL (ref 7.5–12.5)
POTASSIUM SERPL-SCNC: 3.7 MMOL/L (ref 3.5–5.3)
PROT SERPL-MCNC: 7.3 G/DL (ref 6.1–8.1)
RBC # BLD AUTO: 4.55 MILLION/UL (ref 3.8–5.1)
SODIUM SERPL-SCNC: 139 MMOL/L (ref 135–146)
TRIGL SERPL-MCNC: 72 MG/DL
TSH SERPL-ACNC: 2.31 MIU/L
WBC # BLD AUTO: 4.8 THOUSAND/UL (ref 3.8–10.8)

## 2025-05-15 ENCOUNTER — RESULTS FOLLOW-UP (OUTPATIENT)
Dept: FAMILY MEDICINE | Facility: CLINIC | Age: 40
End: 2025-05-15

## 2025-08-14 DIAGNOSIS — F90.0 ATTENTION DEFICIT HYPERACTIVITY DISORDER (ADHD), PREDOMINANTLY INATTENTIVE TYPE: ICD-10-CM

## 2025-08-14 RX ORDER — LISDEXAMFETAMINE DIMESYLATE 50 MG/1
50 CAPSULE ORAL EVERY MORNING
Qty: 30 CAPSULE | Refills: 0 | Status: SHIPPED | OUTPATIENT
Start: 2025-08-14

## 2025-08-14 RX ORDER — LISDEXAMFETAMINE DIMESYLATE 50 MG/1
50 CAPSULE ORAL EVERY MORNING
Qty: 30 CAPSULE | Refills: 0 | Status: SHIPPED | OUTPATIENT
Start: 2025-09-13

## 2025-09-04 ENCOUNTER — TELEPHONE (OUTPATIENT)
Dept: FAMILY MEDICINE | Facility: CLINIC | Age: 40
End: 2025-09-04
Payer: COMMERCIAL

## 2025-09-04 RX ORDER — AZITHROMYCIN 250 MG/1
TABLET, FILM COATED ORAL
Qty: 6 TABLET | Refills: 0 | Status: SHIPPED | OUTPATIENT
Start: 2025-09-04